# Patient Record
Sex: FEMALE | Race: WHITE | Employment: FULL TIME | URBAN - METROPOLITAN AREA
[De-identification: names, ages, dates, MRNs, and addresses within clinical notes are randomized per-mention and may not be internally consistent; named-entity substitution may affect disease eponyms.]

---

## 2017-05-24 ENCOUNTER — HOSPITAL ENCOUNTER (INPATIENT)
Facility: HOSPITAL | Age: 32
LOS: 3 days | Discharge: HOME | DRG: 335 | End: 2017-05-27
Attending: EMERGENCY MEDICINE | Admitting: INTERNAL MEDICINE
Payer: COMMERCIAL

## 2017-05-24 ENCOUNTER — SURGERY (OUTPATIENT)
Age: 32
End: 2017-05-24

## 2017-05-24 ENCOUNTER — APPOINTMENT (OUTPATIENT)
Dept: CT IMAGING | Facility: HOSPITAL | Age: 32
DRG: 335 | End: 2017-05-24
Attending: EMERGENCY MEDICINE
Payer: COMMERCIAL

## 2017-05-24 ENCOUNTER — ANESTHESIA (OUTPATIENT)
Dept: SURGERY | Facility: HOSPITAL | Age: 32
End: 2017-05-24

## 2017-05-24 ENCOUNTER — ANESTHESIA EVENT (OUTPATIENT)
Dept: SURGERY | Facility: HOSPITAL | Age: 32
End: 2017-05-24

## 2017-05-24 DIAGNOSIS — K63.1 BOWEL PERFORATION (HCC): ICD-10-CM

## 2017-05-24 DIAGNOSIS — K56.60 INTESTINAL OBSTRUCTION, UNSPECIFIED TYPE: Primary | ICD-10-CM

## 2017-05-24 PROBLEM — K56.609 INTESTINAL OBSTRUCTION (HCC): Status: ACTIVE | Noted: 2017-05-24

## 2017-05-24 PROCEDURE — 74176 CT ABD & PELVIS W/O CONTRAST: CPT | Performed by: EMERGENCY MEDICINE

## 2017-05-24 PROCEDURE — 0DN80ZZ RELEASE SMALL INTESTINE, OPEN APPROACH: ICD-10-PCS | Performed by: SURGERY

## 2017-05-24 PROCEDURE — 99223 1ST HOSP IP/OBS HIGH 75: CPT | Performed by: INTERNAL MEDICINE

## 2017-05-24 PROCEDURE — 3E1M38Z IRRIGATION OF PERITONEAL CAVITY USING IRRIGATING SUBSTANCE, PERCUTANEOUS APPROACH: ICD-10-PCS | Performed by: SURGERY

## 2017-05-24 DEVICE — SEPRAFILM ADHESION BARRIER (MEMBRANE) IS A STERILE, BIORESORBABLE, TRANSLUCENT ADHESION BARRIER COMPOSED OF TWO ANIONIC POLYSACCHARIDES, SODIUM HYALURONATE (HA) AND CARBOXYMETHYLCELLULOSE (CMC).
Type: IMPLANTABLE DEVICE | Status: FUNCTIONAL
Brand: SEPRAFILM

## 2017-05-24 RX ORDER — IPRATROPIUM BROMIDE AND ALBUTEROL SULFATE 2.5; .5 MG/3ML; MG/3ML
3 SOLUTION RESPIRATORY (INHALATION) EVERY 4 HOURS PRN
Status: DISCONTINUED | OUTPATIENT
Start: 2017-05-24 | End: 2017-05-27

## 2017-05-24 RX ORDER — HYDROCODONE BITARTRATE AND ACETAMINOPHEN 5; 325 MG/1; MG/1
2 TABLET ORAL EVERY 4 HOURS PRN
Status: DISCONTINUED | OUTPATIENT
Start: 2017-05-24 | End: 2017-05-24

## 2017-05-24 RX ORDER — ONDANSETRON 2 MG/ML
4 INJECTION INTRAMUSCULAR; INTRAVENOUS EVERY 6 HOURS PRN
Status: DISCONTINUED | OUTPATIENT
Start: 2017-05-24 | End: 2017-05-27

## 2017-05-24 RX ORDER — MIDAZOLAM HYDROCHLORIDE 1 MG/ML
1 INJECTION INTRAMUSCULAR; INTRAVENOUS EVERY 5 MIN PRN
Status: DISCONTINUED | OUTPATIENT
Start: 2017-05-24 | End: 2017-05-24 | Stop reason: HOSPADM

## 2017-05-24 RX ORDER — SODIUM CHLORIDE, SODIUM LACTATE, POTASSIUM CHLORIDE, CALCIUM CHLORIDE 600; 310; 30; 20 MG/100ML; MG/100ML; MG/100ML; MG/100ML
INJECTION, SOLUTION INTRAVENOUS CONTINUOUS
Status: DISCONTINUED | OUTPATIENT
Start: 2017-05-24 | End: 2017-05-27

## 2017-05-24 RX ORDER — HYDROCODONE BITARTRATE AND ACETAMINOPHEN 5; 325 MG/1; MG/1
1 TABLET ORAL EVERY 4 HOURS PRN
Status: DISCONTINUED | OUTPATIENT
Start: 2017-05-24 | End: 2017-05-27

## 2017-05-24 RX ORDER — ACETAMINOPHEN 325 MG/1
650 TABLET ORAL EVERY 4 HOURS PRN
Status: DISCONTINUED | OUTPATIENT
Start: 2017-05-24 | End: 2017-05-27

## 2017-05-24 RX ORDER — MORPHINE SULFATE 2 MG/ML
2 INJECTION, SOLUTION INTRAMUSCULAR; INTRAVENOUS EVERY 2 HOUR PRN
Status: DISCONTINUED | OUTPATIENT
Start: 2017-05-24 | End: 2017-05-24

## 2017-05-24 RX ORDER — ALBUTEROL SULFATE 2.5 MG/3ML
2.5 SOLUTION RESPIRATORY (INHALATION)
Status: DISCONTINUED | OUTPATIENT
Start: 2017-05-24 | End: 2017-05-24 | Stop reason: HOSPADM

## 2017-05-24 RX ORDER — DIPHENHYDRAMINE HYDROCHLORIDE 50 MG/ML
12.5 INJECTION INTRAMUSCULAR; INTRAVENOUS EVERY 4 HOURS PRN
Status: DISCONTINUED | OUTPATIENT
Start: 2017-05-24 | End: 2017-05-27

## 2017-05-24 RX ORDER — ENOXAPARIN SODIUM 100 MG/ML
40 INJECTION SUBCUTANEOUS DAILY
Status: DISCONTINUED | OUTPATIENT
Start: 2017-05-25 | End: 2017-05-24

## 2017-05-24 RX ORDER — MORPHINE SULFATE 4 MG/ML
4 INJECTION, SOLUTION INTRAMUSCULAR; INTRAVENOUS EVERY 2 HOUR PRN
Status: DISCONTINUED | OUTPATIENT
Start: 2017-05-24 | End: 2017-05-24

## 2017-05-24 RX ORDER — MORPHINE SULFATE 2 MG/ML
1 INJECTION, SOLUTION INTRAMUSCULAR; INTRAVENOUS EVERY 2 HOUR PRN
Status: DISCONTINUED | OUTPATIENT
Start: 2017-05-24 | End: 2017-05-24

## 2017-05-24 RX ORDER — KETOROLAC TROMETHAMINE 30 MG/ML
30 INJECTION, SOLUTION INTRAMUSCULAR; INTRAVENOUS EVERY 6 HOURS PRN
Status: DISPENSED | OUTPATIENT
Start: 2017-05-24 | End: 2017-05-26

## 2017-05-24 RX ORDER — SODIUM CHLORIDE 9 MG/ML
INJECTION, SOLUTION INTRAVENOUS CONTINUOUS
Status: DISCONTINUED | OUTPATIENT
Start: 2017-05-24 | End: 2017-05-27

## 2017-05-24 RX ORDER — SODIUM CHLORIDE 9 MG/ML
INJECTION, SOLUTION INTRAVENOUS CONTINUOUS
Status: DISCONTINUED | OUTPATIENT
Start: 2017-05-24 | End: 2017-05-24

## 2017-05-24 RX ORDER — HYDROCODONE BITARTRATE AND ACETAMINOPHEN 5; 325 MG/1; MG/1
2 TABLET ORAL EVERY 4 HOURS PRN
Status: DISCONTINUED | OUTPATIENT
Start: 2017-05-24 | End: 2017-05-27

## 2017-05-24 RX ORDER — SODIUM CHLORIDE 9 MG/ML
1000 INJECTION, SOLUTION INTRAVENOUS CONTINUOUS
Status: DISCONTINUED | OUTPATIENT
Start: 2017-05-24 | End: 2017-05-24

## 2017-05-24 RX ORDER — ONDANSETRON 2 MG/ML
4 INJECTION INTRAMUSCULAR; INTRAVENOUS ONCE
Status: COMPLETED | OUTPATIENT
Start: 2017-05-24 | End: 2017-05-24

## 2017-05-24 RX ORDER — HYDROMORPHONE HYDROCHLORIDE 1 MG/ML
0.4 INJECTION, SOLUTION INTRAMUSCULAR; INTRAVENOUS; SUBCUTANEOUS EVERY 5 MIN PRN
Status: DISCONTINUED | OUTPATIENT
Start: 2017-05-24 | End: 2017-05-24 | Stop reason: HOSPADM

## 2017-05-24 RX ORDER — NALOXONE HYDROCHLORIDE 0.4 MG/ML
0.08 INJECTION, SOLUTION INTRAMUSCULAR; INTRAVENOUS; SUBCUTANEOUS
Status: DISCONTINUED | OUTPATIENT
Start: 2017-05-24 | End: 2017-05-27

## 2017-05-24 RX ORDER — ENOXAPARIN SODIUM 100 MG/ML
40 INJECTION SUBCUTANEOUS DAILY
Status: DISCONTINUED | OUTPATIENT
Start: 2017-05-24 | End: 2017-05-27

## 2017-05-24 RX ORDER — NALOXONE HYDROCHLORIDE 0.4 MG/ML
80 INJECTION, SOLUTION INTRAMUSCULAR; INTRAVENOUS; SUBCUTANEOUS AS NEEDED
Status: DISCONTINUED | OUTPATIENT
Start: 2017-05-24 | End: 2017-05-24 | Stop reason: HOSPADM

## 2017-05-24 RX ORDER — ONDANSETRON 2 MG/ML
4 INJECTION INTRAMUSCULAR; INTRAVENOUS EVERY 4 HOURS PRN
Status: DISCONTINUED | OUTPATIENT
Start: 2017-05-24 | End: 2017-05-24

## 2017-05-24 RX ORDER — HYDROCODONE BITARTRATE AND ACETAMINOPHEN 5; 325 MG/1; MG/1
1 TABLET ORAL EVERY 4 HOURS PRN
Status: DISCONTINUED | OUTPATIENT
Start: 2017-05-24 | End: 2017-05-24

## 2017-05-24 RX ORDER — MEPERIDINE HYDROCHLORIDE 25 MG/ML
12.5 INJECTION INTRAMUSCULAR; INTRAVENOUS; SUBCUTANEOUS AS NEEDED
Status: DISCONTINUED | OUTPATIENT
Start: 2017-05-24 | End: 2017-05-24 | Stop reason: HOSPADM

## 2017-05-24 RX ORDER — ONDANSETRON 2 MG/ML
4 INJECTION INTRAMUSCULAR; INTRAVENOUS EVERY 6 HOURS PRN
Status: DISCONTINUED | OUTPATIENT
Start: 2017-05-24 | End: 2017-05-24

## 2017-05-24 RX ORDER — BACITRACIN 50000 [USP'U]/1
INJECTION, POWDER, LYOPHILIZED, FOR SOLUTION INTRAMUSCULAR AS NEEDED
Status: DISCONTINUED | OUTPATIENT
Start: 2017-05-24 | End: 2017-05-24 | Stop reason: HOSPADM

## 2017-05-24 RX ORDER — HYDROMORPHONE HYDROCHLORIDE 1 MG/ML
1 INJECTION, SOLUTION INTRAMUSCULAR; INTRAVENOUS; SUBCUTANEOUS EVERY 30 MIN PRN
Status: DISCONTINUED | OUTPATIENT
Start: 2017-05-24 | End: 2017-05-24

## 2017-05-24 RX ORDER — ONDANSETRON 2 MG/ML
4 INJECTION INTRAMUSCULAR; INTRAVENOUS AS NEEDED
Status: DISCONTINUED | OUTPATIENT
Start: 2017-05-24 | End: 2017-05-24 | Stop reason: HOSPADM

## 2017-05-24 RX ORDER — HYDROMORPHONE HYDROCHLORIDE 1 MG/ML
0.4 INJECTION, SOLUTION INTRAMUSCULAR; INTRAVENOUS; SUBCUTANEOUS EVERY 30 MIN PRN
Status: DISCONTINUED | OUTPATIENT
Start: 2017-05-24 | End: 2017-05-25

## 2017-05-24 RX ORDER — HYDROMORPHONE HYDROCHLORIDE 1 MG/ML
INJECTION, SOLUTION INTRAMUSCULAR; INTRAVENOUS; SUBCUTANEOUS
Status: COMPLETED
Start: 2017-05-24 | End: 2017-05-24

## 2017-05-24 RX ORDER — HYDROCODONE BITARTRATE AND ACETAMINOPHEN 5; 325 MG/1; MG/1
2 TABLET ORAL AS NEEDED
Status: DISCONTINUED | OUTPATIENT
Start: 2017-05-24 | End: 2017-05-24 | Stop reason: HOSPADM

## 2017-05-24 RX ORDER — METOCLOPRAMIDE HYDROCHLORIDE 5 MG/ML
10 INJECTION INTRAMUSCULAR; INTRAVENOUS EVERY 6 HOURS PRN
Status: DISCONTINUED | OUTPATIENT
Start: 2017-05-24 | End: 2017-05-27

## 2017-05-24 RX ORDER — HYDROCODONE BITARTRATE AND ACETAMINOPHEN 5; 325 MG/1; MG/1
1 TABLET ORAL AS NEEDED
Status: DISCONTINUED | OUTPATIENT
Start: 2017-05-24 | End: 2017-05-24 | Stop reason: HOSPADM

## 2017-05-24 RX ORDER — NALBUPHINE HCL 10 MG/ML
2.5 AMPUL (ML) INJECTION EVERY 4 HOURS PRN
Status: DISCONTINUED | OUTPATIENT
Start: 2017-05-24 | End: 2017-05-27

## 2017-05-24 NOTE — BRIEF OP NOTE
Pre-Operative Diagnosis: Peritoneal free air [K66.8]     Post-Operative Diagnosis: PERITONEAL FREE AIR, ADHESIONS     Procedure Performed:   Procedure(s):  EXPLORATORY LAPAROTOMY, LYSIS OF ADHESIONS, WASHOUT    Surgeon(s) and Role:     Serena Birmingham,

## 2017-05-24 NOTE — CONSULTS
[facility] Pharmacy Note:  Pain Consult    Deborah Acevedo is a 33year old female started on Dilaudid PCA by Dr. Fadi Allen. Pharmacy was consulted to review medication profile and to discontinue previously ordered narcotics and sedatives.     [facility]

## 2017-05-24 NOTE — H&P
[facility]  History and Physical     [address] [facility] Patient Status:  Emergency    1985 MRN [id number]   Location [address] [facility] Attending Sage Whitaker MD   Hosp Day # 0 PCP None Pcp     Chief Co comprehensive 14 point review of systems was completed. Pertinent positives and negatives noted in the HPI.     Physical Exam:    /58 mmHg  Pulse 79  Temp(Src) 97.5 °F (36.4 °C) (Temporal)  Resp 20  Ht 5' 7\" (1.702 m)  Wt 145 lb (65.772 kg)  BMI 2 needed      Quality:  · DVT Prophylaxis: lovenox  · CODE status: full  · Conley: no    Plan of care discussed with pt and er    Karla Pickering MD  5/24/2017

## 2017-05-24 NOTE — PLAN OF CARE
Patient is post op day 0. Returned from surgery at 0800. She has ng tube to lis, sierra to drain. abd with abd dressing, ice pack to abd. Room air sat 94%. Patient pain is 2-3 at present. Family at bedside. Orientated to room and routine.

## 2017-05-24 NOTE — ANESTHESIA POSTPROCEDURE EVALUATION
THE [facility] Patient Status:  Emergency   Age/Gender 33year old female MRN [id number]   [facility] SURGERY Attending Basilia Ornelas MD   Hosp Day # 0 PCP None Pcp       Anesthesia Post-op Note    Procedur

## 2017-05-24 NOTE — ED INITIAL ASSESSMENT (HPI)
Pt reports generalized abd pain for 1 day. 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 Required bowel surgery at 5days old. States she has adhesions. Has had severe abd pain r/t adhesions in the past. Last in 2013. Emesis X8 in since 2300. Diarhea yesterday and today.

## 2017-05-24 NOTE — ED PROVIDER NOTES
Patient Seen in: [facility] Perioperative    History   Patient presents with:  Abdomen/Flank Pain (GI/)    Stated Complaint: abd pain     HPI    Patient is a 77-year-old female comes in emergency room with chief complaint of abdominal pain and vom Current:BP 95/52 mmHg  Pulse 79  Temp(Src) 97.5 °F (36.4 °C) (Temporal)  Resp 18  Ht 170.2 cm (5' 7\")  Wt 65.772 kg  BMI 22.71 kg/m2  SpO2 100%  LMP 05/14/2017        Physical Exam    GENERAL: No acute distress, well appearing and non-toxic, Alert a WITH CULTURE REFLEX   TYPE AND SCREEN    Narrative: The following orders were created for panel order TYPE AND SCREEN.   Procedure                               Abnormality         Status                     ---------                               -----

## 2017-05-24 NOTE — PAYOR COMM NOTE
Attending Physician: Wilber Escobar MD    5/23  ED           Abdomen/Flank Pain (GI/)    Stated Complaint: abd pain     +  abdominal pain and vomiting.    Patient complains of abdominal pain which started yesterday morning.  Symptoms were intermittent a Temporal    SpO2  05/24/17 0149  100 %    O2 Device  05/24/17 0149  None (Room air)                    CT abdomen pelvis shows moderate to severe distention of proximal loops of small bowel.  Compatible small bowel obstruction.  Pneumoperitoneum is noted c Impression:  Intestinal obstruction  Bowel perforation       IV ZOSYN Q8  DILAUDID PCA          5/24  TO OR      Pre-Operative Diagnosis: Peritoneal free air      Post-Operative Diagnosis: PERITONEAL FREE AIR, ADHESIONS     Procedure Performed:    P

## 2017-05-24 NOTE — CONSULTS
[facility]  Report of Consultation    [address] [facility] Patient Status:  Emergency    1985 MRN [id number]   Location [address] [place] [address] Attending Yani Snider MD   Hosp Day # 0 PCP None Pcp     Reason for [facility] pressure 95/52, pulse 79, temperature 97.5 °F (36.4 °C), temperature source Temporal, resp. rate 18, height 5' 7\" (1.702 m), weight 145 lb (65.772 kg), last menstrual period 05/14/2017, SpO2 100 %. General: Alert, orientated x3. Cooperative.   No appar

## 2017-05-24 NOTE — ANESTHESIA PREPROCEDURE EVALUATION
PRE-OP EVALUATION    Patient Name: Edilma Castro    Pre-op Diagnosis: Peritoneal free air [K66.8]    Procedure(s):  EXPLORATORY LAPAROTOMY, POSSIBLE BOWEL RESECTION    Surgeon(s) and Role:     Willie Regalado MD - Primary    Pre-op vital 05/24/2017   MCH 31.5 05/24/2017   MCHC 33.6 05/24/2017   RDW 12.3 05/24/2017   .0 05/24/2017       Lab Results  Component Value Date    05/24/2017   K 3.6 05/24/2017    05/24/2017   CO2 21.0* 05/24/2017   BUN 12 05/24/2017   CREATSERUM

## 2017-05-25 PROCEDURE — 99232 SBSQ HOSP IP/OBS MODERATE 35: CPT | Performed by: HOSPITALIST

## 2017-05-25 RX ORDER — HYDROMORPHONE HYDROCHLORIDE 1 MG/ML
1 INJECTION, SOLUTION INTRAMUSCULAR; INTRAVENOUS; SUBCUTANEOUS EVERY 2 HOUR PRN
Status: DISCONTINUED | OUTPATIENT
Start: 2017-05-25 | End: 2017-05-27

## 2017-05-25 RX ORDER — HYDROMORPHONE HYDROCHLORIDE 1 MG/ML
0.5 INJECTION, SOLUTION INTRAMUSCULAR; INTRAVENOUS; SUBCUTANEOUS EVERY 2 HOUR PRN
Status: DISCONTINUED | OUTPATIENT
Start: 2017-05-25 | End: 2017-05-27

## 2017-05-25 NOTE — PAYOR COMM NOTE
Attending Physician: Peyton Enrique MD    5/25    CONTINUED STAY    TO OR      OPERATIVE REPORT    PREOPERATIVE DIAGNOSIS:  Acute abdomen with high-grade small bowel obstruction with possible closed loop obstruction with pneumoperitoneum.   POSTOPERATIVE D

## 2017-05-25 NOTE — OPERATIVE REPORT
[facility]    PATIENT'S NAME: Chyna GARCIA RAULITO   ATTENDING PHYSICIAN: Je Baxter M.D. OPERATING PHYSICIAN: Je Baxter M.D.    PATIENT ACCOUNT#:   [de-identified]    LOCATION:  [address] [place] [address]  MEDICAL RECORD #:   [id number]       DA high-grade obstruction in the mid small bowel secondary to adhesions. An adhesion lysis procedure was performed to bring up this obstruction all the way down to the cecum, which was in the left lower quadrant.   The entire colon appeared to be shifted over

## 2017-05-25 NOTE — PROGRESS NOTES
[facility]  Progress Note    [address] [facility] Patient Status:  Inpatient    1985 MRN [id number]   [facility] 0SW-A Attending Basilia Ornelas MD   Hosp Day # 1 PCP None Pcp     Subjective:    Patient reports pain con

## 2017-05-25 NOTE — PLAN OF CARE
Assumed care of patient at 0730, a+ox 4, ngt to lis, with brown output. Denies nausea, c/p or sob at this time. C/o pain in the abdomen that is helped with iv pain meds. pca d/c'd today and using iv meds prn.  On r/a with 02 stats in high 90's, is at bedsid

## 2017-05-25 NOTE — PROGRESS NOTES
[facility]  Progress Note     Kavon Castro Patient Status:  Inpatient    1985 MRN [id number]   [facility] 0SW-A Attending Arnulfo Bagley MD   Hosp Day # 1 PCP None Pcp     Chief Complaint: abd pain    S: Juliana Moder stable  3.  Anemia- ac postop expected, mild    Plan of care: as above    Quality:  · DVT Prophylaxis: Lovenox  · CODE status: full  · Conley: no  · Central line: no    Estimated date of discharge: am  Discharge is dependent on: clinical status  At this poin

## 2017-05-26 PROCEDURE — 99232 SBSQ HOSP IP/OBS MODERATE 35: CPT | Performed by: HOSPITALIST

## 2017-05-26 RX ORDER — IBUPROFEN 600 MG/1
600 TABLET ORAL EVERY 6 HOURS PRN
Status: DISCONTINUED | OUTPATIENT
Start: 2017-05-26 | End: 2017-05-27

## 2017-05-26 RX ORDER — POTASSIUM CHLORIDE 14.9 MG/ML
20 INJECTION INTRAVENOUS ONCE
Status: DISCONTINUED | OUTPATIENT
Start: 2017-05-26 | End: 2017-05-26

## 2017-05-26 NOTE — PLAN OF CARE
Pt tolerating CL diet without nausea. Taking Tylenol for pain; declines Norco.  Motrin order obtained from Dr Aziza Marshall.

## 2017-05-26 NOTE — PLAN OF CARE
Pt tolerated NG being clamped for 8 hours; pt states still passing flatus. NG removed and CL diet ordered.

## 2017-05-26 NOTE — PROGRESS NOTES
[facility]  Progress Note    Concha Castro Patient Status:  Inpatient    1985 MRN [id number]   [facility] 3NW-A Attending Gina Xiao MD   Hosp Day # 2 PCP None Pcp     Subjective:  Passing flatus, ng output

## 2017-05-26 NOTE — PAYOR COMM NOTE
Attending Physician: Yani Snider MD    Review Type: CONTINUED STAY  Reviewer: Janie Orellana     Date: May 26, 2017 - 10:21 AM  Payor: [employer]  Authorization Number: [id number]  Admit date: 5/24/2017  1:39 AM        Progress Notes by Yani Snider MD Action Dose Route User    5/25/2017 1510 Given 1 lozenge Oral Greg Pedroza RN    5/25/2017 1143 Given 1 lozenge Oral Marga Taylor RN      Enoxaparin Sodium (LOVENOX) 40 MG/0.4ML injection 40 mg     Date Action Dose Route User    5/26/2017 0842 Given 40 mg Pedraza

## 2017-05-26 NOTE — PROGRESS NOTES
[facility]  Progress Note     Anna Castro Patient Status:  Inpatient    1985 MRN [id number]   [facility] 3NW-A Attending Kat Jauregui MD   Hosp Day # 2 PCP None Pcp     Chief Complaint: abd pain    S: Diane Nunez grade sbo with possible closed loop obstruction s/p expl lap lysis of adhesions- doing well  2. Asthma stable  3.  Anemia- ac postop expected, mild    Plan of care: as above    Quality:  · DVT Prophylaxis: Lovenox  · CODE status: full  · Conley: no  · [facility]

## 2017-05-26 NOTE — PROGRESS NOTES
Patient transferred from [facility] in stable condition. Alert and orientated x4. Lung sounds clear with diminished bases. IS encouragement. Abdomen soft, tender, and non-distended. Bowel sounds hypoactive and denies passing flatus. NPO with ice.  NG to low-

## 2017-05-26 NOTE — PLAN OF CARE
1900: Pt received AOx4. Room air. NGT to LIS with brownish dark red output. Abd incision PENELOPE w/ steristrips. Family at bedside visiting. 2100: Pt ambulated in the hallway-tolerated. Pt reported passing gas. 0530: Pt slept well.  Dilaudid and Toradol prn-

## 2017-05-27 VITALS
RESPIRATION RATE: 18 BRPM | WEIGHT: 145 LBS | SYSTOLIC BLOOD PRESSURE: 120 MMHG | TEMPERATURE: 99 F | BODY MASS INDEX: 22.76 KG/M2 | HEART RATE: 77 BPM | DIASTOLIC BLOOD PRESSURE: 78 MMHG | HEIGHT: 67 IN | OXYGEN SATURATION: 98 %

## 2017-05-27 PROCEDURE — 99239 HOSP IP/OBS DSCHRG MGMT >30: CPT | Performed by: HOSPITALIST

## 2017-05-27 RX ORDER — IBUPROFEN 600 MG/1
600 TABLET ORAL EVERY 6 HOURS PRN
Qty: 20 TABLET | Refills: 0 | Status: SHIPPED | OUTPATIENT
Start: 2017-05-27

## 2017-05-27 RX ORDER — HYDROCODONE BITARTRATE AND ACETAMINOPHEN 5; 325 MG/1; MG/1
1 TABLET ORAL EVERY 8 HOURS PRN
Qty: 20 TABLET | Refills: 0 | Status: ON HOLD | OUTPATIENT
Start: 2017-05-27 | End: 2019-07-17

## 2017-05-27 NOTE — PLAN OF CARE
Pt tolerated soft diet without increased pain or nausea and ready for discharge. Pt given dc instructions along with RX for Norco and ibuprofen. Pt stated understanding.

## 2017-05-27 NOTE — DISCHARGE SUMMARY
[facility] HOSPITALIST  DISCHARGE SUMMARY     Paddy Corcoran Matthew Patient Status:  Inpatient    1985 MRN [id number]   [facility] 3NW-A Attending Evie Almanzar MD   Hosp Day # 3 PCP None Pcp     Date of Admission: 2017  D Quantity:  20 tablet   Refills:  0         CONTINUE taking these medications       Instructions Prescription details    ALBUTEROL SULFATE        PRN    Refills:  0       SYMBICORT IN        2 puffs once daily    Refills:  0       XYZAL OR        Take 1 tab

## 2017-05-27 NOTE — PROGRESS NOTES
[facility]  Progress Note    Eliezer Castro Patient Status:  Inpatient    1985 MRN [id number]   [facility] 3NW-A Attending Avis Velasquez MD   Hosp Day # 3 PCP None Pcp     Subjective:  Patient is feeling better

## 2017-05-30 NOTE — DISCHARGE SUMMARY
[facility]  Discharge Summary    [address] [facility] Patient Status:  Inpatient    1985 MRN [id number]   [facility] 3NW-A Attending No att. providers found   Hosp Day # 3 PCP None Pcp     Date of Admission: 2017 Visits:  Follow-up with Dr Fadi Allen in 1 week            Nick Eubanks [facility] Surgery  5/30/2017

## 2017-05-31 NOTE — PAYOR COMM NOTE
Review Type: CONTINUED STAY  Reviewer: Felipe Salas     Date: May 31, 2017 - 11:55 AM  Payor: [employer]  Authorization Number: [id number]  Admit date: 5/24/2017  1:39 AM     Discharge date:  5/27/17       Discharge Summaries by Anna Martini MD at 5/27/2 HOSPITALIST  Progress Note    [address] [facility]  Patient Status:  Inpatient      1985  Wang Jimenez [id number]    [facility] 3NW-A  Attending  Hallie George MD    Hosp Day #  2  PCP  None Pcp      Chief Complaint: abd pain •  enoxaparin   40 mg  Subcutaneous  Daily          ASSESSMENT / PLAN:        2. Acute abd pain with high grade sbo with possible closed loop obstruction s/p expl lap lysis of adhesions- doing well  3. Asthma stable  4.  Anemia- ac postop expected, mild

## 2017-10-10 ENCOUNTER — CHARTING TRANS (OUTPATIENT)
Dept: OTHER | Age: 32
End: 2017-10-10

## 2018-01-06 ENCOUNTER — CHARTING TRANS (OUTPATIENT)
Dept: OTHER | Age: 33
End: 2018-01-06

## 2018-09-12 ENCOUNTER — CHARTING TRANS (OUTPATIENT)
Dept: OTHER | Age: 33
End: 2018-09-12

## 2018-09-20 ENCOUNTER — HOSPITAL (OUTPATIENT)
Dept: OTHER | Age: 33
End: 2018-09-20
Attending: ANESTHESIOLOGY

## 2018-11-02 VITALS
BODY MASS INDEX: 22.76 KG/M2 | OXYGEN SATURATION: 97 % | WEIGHT: 145 LBS | SYSTOLIC BLOOD PRESSURE: 130 MMHG | HEART RATE: 80 BPM | DIASTOLIC BLOOD PRESSURE: 77 MMHG | HEIGHT: 67 IN

## 2018-12-04 LAB
ANTIBODY SCREEN OB: NEGATIVE
HEPATITIS B SURFACE ANTIGEN OB: NEGATIVE
HIV RESULT OB: NEGATIVE
RAPID PLASMA REAGIN OB: NONREACTIVE

## 2019-02-26 ENCOUNTER — OFFICE VISIT (OUTPATIENT)
Dept: PERINATAL CARE | Facility: HOSPITAL | Age: 34
End: 2019-02-26
Attending: OBSTETRICS & GYNECOLOGY
Payer: COMMERCIAL

## 2019-02-26 VITALS
DIASTOLIC BLOOD PRESSURE: 75 MMHG | HEART RATE: 78 BPM | HEIGHT: 67 IN | WEIGHT: 170 LBS | BODY MASS INDEX: 26.68 KG/M2 | SYSTOLIC BLOOD PRESSURE: 112 MMHG

## 2019-02-26 DIAGNOSIS — O09.812 PREGNANCY RESULTING FROM IN VITRO FERTILIZATION IN SECOND TRIMESTER: ICD-10-CM

## 2019-02-26 DIAGNOSIS — O09.512 PRIMIGRAVIDA OF ADVANCED MATERNAL AGE IN SECOND TRIMESTER: ICD-10-CM

## 2019-02-26 DIAGNOSIS — Z87.19 HISTORY OF SMALL BOWEL OBSTRUCTION: ICD-10-CM

## 2019-02-26 PROBLEM — O09.519 AMA (ADVANCED MATERNAL AGE) PRIMIGRAVIDA 35+: Status: ACTIVE | Noted: 2019-02-26

## 2019-02-26 PROBLEM — O09.519 AMA (ADVANCED MATERNAL AGE) PRIMIGRAVIDA 35+ (HCC): Status: ACTIVE | Noted: 2019-02-26

## 2019-02-26 PROCEDURE — 99243 OFF/OP CNSLTJ NEW/EST LOW 30: CPT | Performed by: OBSTETRICS & GYNECOLOGY

## 2019-02-26 PROCEDURE — 76811 OB US DETAILED SNGL FETUS: CPT | Performed by: OBSTETRICS & GYNECOLOGY

## 2019-02-26 RX ORDER — CHOLECALCIFEROL (VITAMIN D3) 25 MCG
1 TABLET,CHEWABLE ORAL DAILY
COMMUNITY

## 2019-02-26 NOTE — PROGRESS NOTES
Indication: IVF, hx situs inversus abd organs. Maternal age (26 years). ____________________________________________________________________________  History: Age: 26 years. Maternal age at [facility]: 26 years. : 3 Para: 2.  No of embryos: 1, (6 days pellucidi. Heart: Visualized and normal appearance: four-chamber view, left outflow tract, right outflow tract.    Aortic arch: Normal.    ____________________________________________________________________________  Maternal Structures:  Cervical length 4 three fold higher rates of hospitalization,  delivery, and pregnancy-related complications when compared to their younger counterparts. The two most common medical problems complicating these  pregnanccies are hypertension and diabetes.    The inci abnormalities are structural and unrelated to aneuploidy, thus they would not be detected by karyotype analysis. For these reasons a complete, detailed ultrasound (level II) is advised even if the fetus has a normal karyotype.       Fetal Aneuploidy      I risk of  birth and low birth weight in gay pregnancies. ART does not appear to be an independent risk factor for adverse neurodevelopment outcome.   ART appears to be at increased risk of delivering offspring with congenital malformations comp

## 2019-03-19 ENCOUNTER — OFFICE VISIT (OUTPATIENT)
Dept: PERINATAL CARE | Facility: HOSPITAL | Age: 34
End: 2019-03-19
Attending: OBSTETRICS & GYNECOLOGY
Payer: COMMERCIAL

## 2019-03-19 VITALS
WEIGHT: 173 LBS | HEIGHT: 67 IN | BODY MASS INDEX: 27.15 KG/M2 | SYSTOLIC BLOOD PRESSURE: 140 MMHG | HEART RATE: 75 BPM | DIASTOLIC BLOOD PRESSURE: 77 MMHG

## 2019-03-19 DIAGNOSIS — O09.512 PRIMIGRAVIDA OF ADVANCED MATERNAL AGE IN SECOND TRIMESTER: ICD-10-CM

## 2019-03-19 DIAGNOSIS — O09.812 PREGNANCY RESULTING FROM IN VITRO FERTILIZATION IN SECOND TRIMESTER: ICD-10-CM

## 2019-03-19 PROCEDURE — 76825 ECHO EXAM OF FETAL HEART: CPT | Performed by: OBSTETRICS & GYNECOLOGY

## 2019-03-19 PROCEDURE — 99213 OFFICE O/P EST LOW 20 MIN: CPT | Performed by: OBSTETRICS & GYNECOLOGY

## 2019-03-19 PROCEDURE — 93325 DOPPLER ECHO COLOR FLOW MAPG: CPT | Performed by: OBSTETRICS & GYNECOLOGY

## 2019-03-19 PROCEDURE — 76827 ECHO EXAM OF FETAL HEART: CPT | Performed by: OBSTETRICS & GYNECOLOGY

## 2019-03-19 NOTE — PROGRESS NOTES
Indication: IVF, hx of situs inversus. Maternal age (26 years). ____________________________________________________________________________  History: Age: 26 years. Maternal age at [facility]: 26 years. : 3 Para: 2.  No of embryos: 1, (6 days between pregnancies in women of advanced maternal age (28 or older at delivery) are associated with elevated risks.  Specifically, there is a higher rate of:  · Fetal malformations  · Preeclampsia  · Gestational diabetes  · Intrauterine fetal death            Jacqueline is a right to left shunting across the patent foramen ovale. There is a normal appearance of the aorta and branching pattern of the head vessels. There appears to be a structurally normal fetal heart and rhythm.   The patient was made aware of the oconnell

## 2019-04-19 LAB — HIV RESULT OB: NEGATIVE

## 2019-05-14 NOTE — PROGRESS NOTES
Israel Mcclellan  Dear Dr. Jayne Mares,     Thank you for requesting ultrasound evaluation and maternal fetal medicine consultation on your patient [facility]   As you are aware she is a 26year old female  wit 34w2d)  .0 mm 23rd% 32w0d (29w0d to 34w6d)  .7 mm 61st% 32w0d (31w0d to 33w0d)  FL 61.1 mm 41st% 31w5d (28w5d to 34w5d)  OFD 99.4 mm 14th% 29w6d  HUM 53.6 mm 43rd% 31w2d  VENTRp 5.1 mm n/a  HC/AC Ratio 1.037  33rd%  FL/AC Ratio 0.218  n/a  BPD/  labor and monitoring fetal movement. We discussed reasons for her to call her physician. We discussed the recommended plan of care based on her  risk factors.   Al Pastor had her questions answered to her satisfaction.     IMPRESSION:  · IUP

## 2019-05-15 ENCOUNTER — OFFICE VISIT (OUTPATIENT)
Dept: PERINATAL CARE | Facility: HOSPITAL | Age: 34
End: 2019-05-15
Attending: OBSTETRICS & GYNECOLOGY
Payer: COMMERCIAL

## 2019-05-15 VITALS
SYSTOLIC BLOOD PRESSURE: 115 MMHG | WEIGHT: 181 LBS | BODY MASS INDEX: 28 KG/M2 | DIASTOLIC BLOOD PRESSURE: 72 MMHG | HEART RATE: 63 BPM

## 2019-05-15 DIAGNOSIS — O09.513 PRIMIGRAVIDA OF ADVANCED MATERNAL AGE IN THIRD TRIMESTER: ICD-10-CM

## 2019-05-15 PROCEDURE — 99213 OFFICE O/P EST LOW 20 MIN: CPT | Performed by: OBSTETRICS & GYNECOLOGY

## 2019-05-15 PROCEDURE — 76819 FETAL BIOPHYS PROFIL W/O NST: CPT | Performed by: OBSTETRICS & GYNECOLOGY

## 2019-05-15 PROCEDURE — 76805 OB US >/= 14 WKS SNGL FETUS: CPT | Performed by: OBSTETRICS & GYNECOLOGY

## 2019-06-14 LAB — STREP GP B CULT OB: NEGATIVE

## 2019-07-14 ENCOUNTER — APPOINTMENT (OUTPATIENT)
Dept: OBGYN CLINIC | Facility: HOSPITAL | Age: 34
End: 2019-07-14
Payer: COMMERCIAL

## 2019-07-14 ENCOUNTER — HOSPITAL ENCOUNTER (INPATIENT)
Facility: HOSPITAL | Age: 34
LOS: 3 days | Discharge: HOME | End: 2019-07-17
Attending: OBSTETRICS & GYNECOLOGY | Admitting: OBSTETRICS & GYNECOLOGY
Payer: COMMERCIAL

## 2019-07-14 PROBLEM — Z34.90 PREGNANCY (HCC): Status: ACTIVE | Noted: 2019-07-14

## 2019-07-14 PROBLEM — Z34.90 PREGNANCY: Status: ACTIVE | Noted: 2019-07-14

## 2019-07-14 LAB
ANTIBODY SCREEN: NEGATIVE
DEPRECATED RDW RBC AUTO: 41.1 FL (ref 35.1–46.3)
ERYTHROCYTE [DISTWIDTH] IN BLOOD BY AUTOMATED COUNT: 11.9 % (ref 11–15)
HCT VFR BLD AUTO: 38.4 % (ref 35–48)
HGB BLD-MCNC: 13 G/DL (ref 12–16)
MCH RBC QN AUTO: 32.2 PG (ref 26–34)
MCHC RBC AUTO-ENTMCNC: 33.9 G/DL (ref 31–37)
MCV RBC AUTO: 95 FL (ref 80–100)
PLATELET # BLD AUTO: 207 10(3)UL (ref 150–450)
RBC # BLD AUTO: 4.04 X10(6)UL (ref 3.8–5.3)
RH BLOOD TYPE: POSITIVE
T PALLIDUM AB SER QL IA: NONREACTIVE
WBC # BLD AUTO: 14.7 X10(3) UL (ref 4–11)

## 2019-07-14 PROCEDURE — 86780 TREPONEMA PALLIDUM: CPT | Performed by: OBSTETRICS & GYNECOLOGY

## 2019-07-14 PROCEDURE — 86900 BLOOD TYPING SEROLOGIC ABO: CPT | Performed by: OBSTETRICS & GYNECOLOGY

## 2019-07-14 PROCEDURE — 3E0P7VZ INTRODUCTION OF HORMONE INTO FEMALE REPRODUCTIVE, VIA NATURAL OR ARTIFICIAL OPENING: ICD-10-PCS | Performed by: OBSTETRICS & GYNECOLOGY

## 2019-07-14 PROCEDURE — 85027 COMPLETE CBC AUTOMATED: CPT | Performed by: OBSTETRICS & GYNECOLOGY

## 2019-07-14 PROCEDURE — 86901 BLOOD TYPING SEROLOGIC RH(D): CPT | Performed by: OBSTETRICS & GYNECOLOGY

## 2019-07-14 PROCEDURE — 86850 RBC ANTIBODY SCREEN: CPT | Performed by: OBSTETRICS & GYNECOLOGY

## 2019-07-14 RX ORDER — IBUPROFEN 600 MG/1
600 TABLET ORAL ONCE AS NEEDED
Status: DISCONTINUED | OUTPATIENT
Start: 2019-07-14 | End: 2019-07-15 | Stop reason: HOSPADM

## 2019-07-14 RX ORDER — TERBUTALINE SULFATE 1 MG/ML
0.25 INJECTION, SOLUTION SUBCUTANEOUS AS NEEDED
Status: DISCONTINUED | OUTPATIENT
Start: 2019-07-14 | End: 2019-07-15 | Stop reason: HOSPADM

## 2019-07-14 RX ORDER — CALCIUM CARBONATE 200(500)MG
1000 TABLET,CHEWABLE ORAL 2 TIMES DAILY PRN
Status: DISCONTINUED | OUTPATIENT
Start: 2019-07-14 | End: 2019-07-15

## 2019-07-14 RX ORDER — DEXTROSE, SODIUM CHLORIDE, SODIUM LACTATE, POTASSIUM CHLORIDE, AND CALCIUM CHLORIDE 5; .6; .31; .03; .02 G/100ML; G/100ML; G/100ML; G/100ML; G/100ML
INJECTION, SOLUTION INTRAVENOUS AS NEEDED
Status: DISCONTINUED | OUTPATIENT
Start: 2019-07-14 | End: 2019-07-15 | Stop reason: HOSPADM

## 2019-07-14 RX ORDER — ZOLPIDEM TARTRATE 5 MG/1
5 TABLET ORAL NIGHTLY PRN
Status: DISCONTINUED | OUTPATIENT
Start: 2019-07-14 | End: 2019-07-15 | Stop reason: HOSPADM

## 2019-07-14 RX ORDER — SODIUM CHLORIDE, SODIUM LACTATE, POTASSIUM CHLORIDE, CALCIUM CHLORIDE 600; 310; 30; 20 MG/100ML; MG/100ML; MG/100ML; MG/100ML
INJECTION, SOLUTION INTRAVENOUS CONTINUOUS
Status: DISCONTINUED | OUTPATIENT
Start: 2019-07-14 | End: 2019-07-15 | Stop reason: HOSPADM

## 2019-07-15 PROCEDURE — 0UQMXZZ REPAIR VULVA, EXTERNAL APPROACH: ICD-10-PCS | Performed by: OBSTETRICS & GYNECOLOGY

## 2019-07-15 PROCEDURE — 0UBMXZZ EXCISION OF VULVA, EXTERNAL APPROACH: ICD-10-PCS | Performed by: OBSTETRICS & GYNECOLOGY

## 2019-07-15 PROCEDURE — 0KQM0ZZ REPAIR PERINEUM MUSCLE, OPEN APPROACH: ICD-10-PCS | Performed by: OBSTETRICS & GYNECOLOGY

## 2019-07-15 RX ORDER — ACETAMINOPHEN 325 MG/1
650 TABLET ORAL EVERY 6 HOURS PRN
Status: DISCONTINUED | OUTPATIENT
Start: 2019-07-15 | End: 2019-07-17

## 2019-07-15 RX ORDER — CEFAZOLIN SODIUM/WATER 2 G/20 ML
2 SYRINGE (ML) INTRAVENOUS ONCE
Status: COMPLETED | OUTPATIENT
Start: 2019-07-15 | End: 2019-07-15

## 2019-07-15 RX ORDER — HYDROMORPHONE HYDROCHLORIDE 1 MG/ML
1 INJECTION, SOLUTION INTRAMUSCULAR; INTRAVENOUS; SUBCUTANEOUS EVERY 2 HOUR PRN
Status: DISCONTINUED | OUTPATIENT
Start: 2019-07-15 | End: 2019-07-15

## 2019-07-15 RX ORDER — EPHEDRINE SULFATE/0.9% NACL/PF 25 MG/5 ML
5 SYRINGE (ML) INTRAVENOUS AS NEEDED
Status: DISCONTINUED | OUTPATIENT
Start: 2019-07-15 | End: 2019-07-15

## 2019-07-15 RX ORDER — ZOLPIDEM TARTRATE 5 MG/1
5 TABLET ORAL NIGHTLY PRN
Status: DISCONTINUED | OUTPATIENT
Start: 2019-07-15 | End: 2019-07-17

## 2019-07-15 RX ORDER — ONDANSETRON 2 MG/ML
INJECTION INTRAMUSCULAR; INTRAVENOUS
Status: COMPLETED
Start: 2019-07-15 | End: 2019-07-15

## 2019-07-15 RX ORDER — NALBUPHINE HCL 10 MG/ML
2.5 AMPUL (ML) INJECTION
Status: DISCONTINUED | OUTPATIENT
Start: 2019-07-15 | End: 2019-07-15

## 2019-07-15 RX ORDER — ONDANSETRON 2 MG/ML
4 INJECTION INTRAMUSCULAR; INTRAVENOUS EVERY 6 HOURS PRN
Status: DISCONTINUED | OUTPATIENT
Start: 2019-07-15 | End: 2019-07-15

## 2019-07-15 RX ORDER — SIMETHICONE 80 MG
80 TABLET,CHEWABLE ORAL 3 TIMES DAILY PRN
Status: DISCONTINUED | OUTPATIENT
Start: 2019-07-15 | End: 2019-07-17

## 2019-07-15 RX ORDER — CEFAZOLIN SODIUM/WATER 2 G/20 ML
SYRINGE (ML) INTRAVENOUS
Status: DISPENSED
Start: 2019-07-15 | End: 2019-07-16

## 2019-07-15 RX ORDER — IBUPROFEN 600 MG/1
600 TABLET ORAL EVERY 6 HOURS
Status: DISCONTINUED | OUTPATIENT
Start: 2019-07-15 | End: 2019-07-17

## 2019-07-15 RX ORDER — DOCUSATE SODIUM 100 MG/1
100 CAPSULE, LIQUID FILLED ORAL
Status: DISCONTINUED | OUTPATIENT
Start: 2019-07-15 | End: 2019-07-17

## 2019-07-15 RX ORDER — BISACODYL 10 MG
10 SUPPOSITORY, RECTAL RECTAL ONCE AS NEEDED
Status: ACTIVE | OUTPATIENT
Start: 2019-07-15 | End: 2019-07-15

## 2019-07-15 NOTE — L&D DELIVERY NOTE
[facility]    PATIENT'S NAME: Alysa [facility]   ATTENDING PHYSICIAN: Leah Cortes M.D.    PATIENT ACCOUNT #: [de-identified] LOCATION:  [address] [place] [address]   MEDICAL RECORD #: [id number] YOB: 1985   ADMISSION DATE: 07/14/2019 D fertilization pregnancy. 4.   History of maternal abdominal situs inversus. 5.   Vulvar lesion. PLAN:  Ancef 2 g IV piggyback given times x1 dose. Routine postpartum care, mom and infant.      Dictated By Eleni Bosworth, M.D.  d: 07/15/2019 14:18

## 2019-07-15 NOTE — PROGRESS NOTES
Pt presents as  w/ edc of 19 here for scheduled cervidil IOL. Pt ambulatory and admitted to room 108 accompanied by . EFM tested and applied, FHTs tracing and audible in 130s. Plan of care discussed, pt verbalizes understanding and agrees.

## 2019-07-15 NOTE — H&P
[facility]    PATIENT'S NAME: Kevin CABEZAS   ATTENDING PHYSICIAN: Aimee Frances M.D.    PATIENT ACCOUNT#:   [de-identified]    LOCATION:  [address] [place] [address]  MEDICAL RECORD #:   [id number]       YOB: 1985  ADMISSION DATE: Antibody screen negative x2. ASSESSMENT:    1. Intrauterine pregnancy at 40 and 1/7 weeks. 2.   History of in vitro fertilization. 3.   Advanced maternal age.   4.   History of situs invertus of gastrointestinal tract, status post abdominal lysis

## 2019-07-15 NOTE — PROGRESS NOTES
Patient up to bathroom with assist x 2. Unable to void at this time. Patient transferred to mother/baby room per wheelchair in stable condition with baby and personal belongings. Accompanied by significant other and staff. Report given to mother/baby RN.

## 2019-07-15 NOTE — PROGRESS NOTES
Patient admitted to MB via Félix Shahram to room  Safety precautions initiated  Bed in low position  Call light in reach  Knows to call for assistance first time out of bed

## 2019-07-15 NOTE — L&D DELIVERY NOTE
Judson Castro [[id number]]    Labor Events     labor?:  No   steroids?:  None  Cervical ripening date/time:  2019 1950  Cervical ripening type:  Cervidil  Rupture date/time:  7/15/2019 1244     Rupture type:  SROM, Intac Acrocyanotic Completely pink    Heart rate Absent <100 bpm >100 bpm    Reflex irritability No response Grimace Cry or active withdrawal    Muscle tone Limp Some flexion Active motion    Respiratory effort Absent Weak cry; hypoventilation Good, crying

## 2019-07-16 LAB
BASOPHILS # BLD AUTO: 0.03 X10(3) UL (ref 0–0.2)
BASOPHILS NFR BLD AUTO: 0.2 %
DEPRECATED RDW RBC AUTO: 43.3 FL (ref 35.1–46.3)
EOSINOPHIL # BLD AUTO: 0.13 X10(3) UL (ref 0–0.7)
EOSINOPHIL NFR BLD AUTO: 0.9 %
ERYTHROCYTE [DISTWIDTH] IN BLOOD BY AUTOMATED COUNT: 12.2 % (ref 11–15)
HCT VFR BLD AUTO: 32.9 % (ref 35–48)
HGB BLD-MCNC: 11 G/DL (ref 12–16)
IMM GRANULOCYTES # BLD AUTO: 0.07 X10(3) UL (ref 0–1)
IMM GRANULOCYTES NFR BLD: 0.5 %
LYMPHOCYTES # BLD AUTO: 3.49 X10(3) UL (ref 1–4)
LYMPHOCYTES NFR BLD AUTO: 23.8 %
MCH RBC QN AUTO: 32.5 PG (ref 26–34)
MCHC RBC AUTO-ENTMCNC: 33.4 G/DL (ref 31–37)
MCV RBC AUTO: 97.3 FL (ref 80–100)
MONOCYTES # BLD AUTO: 0.94 X10(3) UL (ref 0.1–1)
MONOCYTES NFR BLD AUTO: 6.4 %
NEUTROPHILS # BLD AUTO: 9.98 X10 (3) UL (ref 1.5–7.7)
NEUTROPHILS # BLD AUTO: 9.98 X10(3) UL (ref 1.5–7.7)
NEUTROPHILS NFR BLD AUTO: 68.2 %
PLATELET # BLD AUTO: 171 10(3)UL (ref 150–450)
RBC # BLD AUTO: 3.38 X10(6)UL (ref 3.8–5.3)
WBC # BLD AUTO: 14.6 X10(3) UL (ref 4–11)

## 2019-07-16 PROCEDURE — 85025 COMPLETE CBC W/AUTO DIFF WBC: CPT | Performed by: OBSTETRICS & GYNECOLOGY

## 2019-07-16 NOTE — PROGRESS NOTES
[address] [facility] Vaginal Delivery Progress Note    [address] [facility] Patient Status:  Inpatient    1985 MRN [id number]   [facility] 1SW-J Attending Rhoda Merlin, MD, MD   [facility] Day # 2 PCP None Pcp     SUBJEC

## 2019-07-17 VITALS
WEIGHT: 196 LBS | RESPIRATION RATE: 17 BRPM | DIASTOLIC BLOOD PRESSURE: 64 MMHG | HEIGHT: 66.5 IN | TEMPERATURE: 99 F | HEART RATE: 67 BPM | BODY MASS INDEX: 31.13 KG/M2 | SYSTOLIC BLOOD PRESSURE: 105 MMHG

## 2019-07-17 PROBLEM — K56.60 INTESTINAL OBSTRUCTION, UNSPECIFIED TYPE: Status: RESOLVED | Noted: 2017-05-24 | Resolved: 2019-07-17

## 2019-07-17 PROBLEM — O09.519 AMA (ADVANCED MATERNAL AGE) PRIMIGRAVIDA 35+: Status: RESOLVED | Noted: 2019-02-26 | Resolved: 2019-07-17

## 2019-07-17 PROBLEM — O09.519 AMA (ADVANCED MATERNAL AGE) PRIMIGRAVIDA 35+ (HCC): Status: RESOLVED | Noted: 2019-02-26 | Resolved: 2019-07-17

## 2019-07-17 PROBLEM — O09.812 PREGNANCY RESULTING FROM IN VITRO FERTILIZATION IN SECOND TRIMESTER: Status: RESOLVED | Noted: 2019-02-26 | Resolved: 2019-07-17

## 2019-07-17 PROBLEM — O09.812 PREGNANCY RESULTING FROM IN VITRO FERTILIZATION IN SECOND TRIMESTER (HCC): Status: RESOLVED | Noted: 2019-02-26 | Resolved: 2019-07-17

## 2019-07-17 PROBLEM — K63.1 BOWEL PERFORATION (HCC): Status: RESOLVED | Noted: 2017-05-24 | Resolved: 2019-07-17

## 2019-07-17 PROBLEM — K56.609 INTESTINAL OBSTRUCTION (HCC): Status: RESOLVED | Noted: 2017-05-24 | Resolved: 2019-07-17

## 2019-07-17 NOTE — PROGRESS NOTES
[address] [facility] Vaginal Delivery Progress Note    [address] [facility] Patient Status:  Inpatient    1985 MRN [id number]   [facility] 1SW-J Attending Alana Patricia MD, MD   [facility] Day # 3 PCP None Pcp     SUBJEC

## 2019-07-20 ENCOUNTER — TELEPHONE (OUTPATIENT)
Dept: OBGYN UNIT | Facility: HOSPITAL | Age: 34
End: 2019-07-20

## 2019-07-21 ENCOUNTER — TELEPHONE (OUTPATIENT)
Dept: OBGYN UNIT | Facility: HOSPITAL | Age: 34
End: 2019-07-21

## 2019-07-22 ENCOUNTER — NURSE ONLY (OUTPATIENT)
Dept: LACTATION | Facility: HOSPITAL | Age: 34
End: 2019-07-22
Attending: OBSTETRICS & GYNECOLOGY
Payer: COMMERCIAL

## 2019-07-22 VITALS — TEMPERATURE: 99 F

## 2019-07-22 PROCEDURE — 99213 OFFICE O/P EST LOW 20 MIN: CPT

## 2019-07-22 NOTE — PROGRESS NOTES
LACTATION NOTE - MOTHER      Evaluation Type: Outpatient Initial    Problems identified  Problems identified: Knowledge deficit; Unable to acheive sustained latch;Flat nipple(s);Milk supply WNL  Problems Identified Other: Baby unable to sustain latch to jericho

## 2019-07-30 ENCOUNTER — NURSE ONLY (OUTPATIENT)
Dept: LACTATION | Facility: HOSPITAL | Age: 34
End: 2019-07-30
Attending: OBSTETRICS & GYNECOLOGY
Payer: COMMERCIAL

## 2019-07-30 DIAGNOSIS — O92.79 POOR LATCH ON, POSTPARTUM: Primary | ICD-10-CM

## 2019-07-30 PROCEDURE — 99212 OFFICE O/P EST SF 10 MIN: CPT

## 2019-07-30 NOTE — PROGRESS NOTES
LACTATION NOTE - MOTHER      Evaluation Type: Outpatient Follow Up    Problems identified  Problems identified: Knowledge deficit;Milk supply WNL; Flat nipple(s)  Problems Identified Other: Baby is latching to breast with assist of nipple shield, good milk

## 2019-08-28 ENCOUNTER — TELEPHONE (OUTPATIENT)
Dept: LACTATION | Facility: HOSPITAL | Age: 34
End: 2019-08-28

## 2019-08-28 NOTE — TELEPHONE ENCOUNTER
Jennifer Gutierrez called [facility] for information regarding alternatives to giving her baby Poly Vi Sol vitamins. He is exclusively  and she states that the vitamins upset his stomach and make him very fussy and difficult to console.     I share

## 2019-10-21 ENCOUNTER — TELEPHONE (OUTPATIENT)
Dept: LACTATION | Facility: HOSPITAL | Age: 34
End: 2019-10-21

## 2019-10-21 NOTE — TELEPHONE ENCOUNTER
Issues Identified: (actual or potential)  Mom requesting information on cold medications safe to take while breastfeeding. Education Provided  Information provided on safety of Zicam and Dayquil.  Active ingredients in both medications looked up and revi

## 2020-10-02 PROBLEM — M51.37 OTHER INTERVERTEBRAL DISC DEGENERATION, LUMBOSACRAL REGION: Status: ACTIVE | Noted: 2020-10-02

## 2020-10-02 PROBLEM — M51.379 OTHER INTERVERTEBRAL DISC DEGENERATION, LUMBOSACRAL REGION: Status: ACTIVE | Noted: 2020-10-02

## 2020-10-02 PROBLEM — M51.27 OTHER INTERVERTEBRAL DISC DISPLACEMENT, LUMBOSACRAL REGION: Status: ACTIVE | Noted: 2020-10-02

## 2020-10-02 PROBLEM — M54.50 LOW BACK PAIN: Status: ACTIVE | Noted: 2020-10-02

## 2021-01-27 LAB
ANTIBODY SCREEN OB: NEGATIVE
HEPATITIS B SURFACE ANTIGEN OB: NEGATIVE
HIV RESULT OB: NEGATIVE
RAPID PLASMA REAGIN OB: NONREACTIVE
RH FACTOR OB: POSITIVE

## 2021-04-27 NOTE — PROGRESS NOTES
Outpatient Maternal-Fetal Medicine Consultation    Dear Dr. Az Horner,    Thank you for requesting ultrasound evaluation and maternal fetal medicine consultation on your patient Catherine Castro.   As you are aware she is a 38year old f Seasonal                      PHYSICAL EXAMINATION:  /82   Pulse 69   Ht 5' 7\" (1.702 m)   Wt 165 lb (74.8 kg)   LMP 12/13/2020   BMI 25.84 kg/m²   General: alert and oriented,no acute distress  Abdomen: gravid, soft, non-tender  Extremities: preeclampsia; hence, no further significant alterations in obstetric care are advised.     Medical Complications    Women 28years of age or older can expect to experience two to three fold higher rates of hospitalization,  delivery, and pregnancy-r older.    Fetal Malformations    Cardiac malformations, clubfoot, and diaphragmatic hernia appear to occur with increased frequency in offspring of older women.  These abnormalities are structural and unrelated to aneuploidy, thus they would not be detected previous pregnancy. LOW LYING PLACENTA:  Rates of  delivery and antepartum bleeding decrease as the distance between the placental edge and internal os increases.  There is a general consensus that there is a reasonable possibility of vaginal d the time (>50%) was spent in review of records, consultation and coordination of care. Our discussion is summarized above. The approximate physician face-to-face time was 40 minutes.

## 2021-05-04 ENCOUNTER — ULTRASOUND ENCOUNTER (OUTPATIENT)
Dept: PERINATAL CARE | Facility: HOSPITAL | Age: 36
End: 2021-05-04
Attending: OBSTETRICS & GYNECOLOGY
Payer: COMMERCIAL

## 2021-05-04 VITALS
BODY MASS INDEX: 25.9 KG/M2 | DIASTOLIC BLOOD PRESSURE: 82 MMHG | WEIGHT: 165 LBS | HEART RATE: 69 BPM | SYSTOLIC BLOOD PRESSURE: 121 MMHG | HEIGHT: 67 IN

## 2021-05-04 DIAGNOSIS — O09.812 PREGNANCY RESULTING FROM IN VITRO FERTILIZATION IN SECOND TRIMESTER: ICD-10-CM

## 2021-05-04 DIAGNOSIS — O09.522 MULTIGRAVIDA OF ADVANCED MATERNAL AGE IN SECOND TRIMESTER: ICD-10-CM

## 2021-05-04 DIAGNOSIS — O09.513 PRIMIGRAVIDA OF ADVANCED MATERNAL AGE IN THIRD TRIMESTER: ICD-10-CM

## 2021-05-04 DIAGNOSIS — O09.513 PRIMIGRAVIDA OF ADVANCED MATERNAL AGE IN THIRD TRIMESTER: Primary | ICD-10-CM

## 2021-05-04 DIAGNOSIS — Z87.19 HISTORY OF SMALL BOWEL OBSTRUCTION: ICD-10-CM

## 2021-05-04 PROCEDURE — 76811 OB US DETAILED SNGL FETUS: CPT | Performed by: OBSTETRICS & GYNECOLOGY

## 2021-05-04 PROCEDURE — 99243 OFF/OP CNSLTJ NEW/EST LOW 30: CPT | Performed by: OBSTETRICS & GYNECOLOGY

## 2021-07-14 NOTE — PROGRESS NOTES
Harsh Feldman     Dear Dr. Stan Paz,     Thank you for requesting ultrasound evaluation and maternal fetal medicine consultation on your patient Anna Morales.   As you are aware she is a 38year old female  with age and pregnancy. We discussed risks for fetal growth abnormalities, hypertensive complications, maternal hospitalizations,  mortality and  delivery. We discussed the plan of care and the rationale for the increased surveillance.   We re

## 2021-07-27 ENCOUNTER — OFFICE VISIT (OUTPATIENT)
Dept: PERINATAL CARE | Facility: HOSPITAL | Age: 36
End: 2021-07-27
Attending: OBSTETRICS & GYNECOLOGY
Payer: COMMERCIAL

## 2021-07-27 VITALS
HEIGHT: 67 IN | DIASTOLIC BLOOD PRESSURE: 63 MMHG | SYSTOLIC BLOOD PRESSURE: 114 MMHG | WEIGHT: 188 LBS | HEART RATE: 73 BPM | BODY MASS INDEX: 29.51 KG/M2

## 2021-07-27 DIAGNOSIS — Z03.72 SUSPECTED PROBLEM WITH PLACENTA NOT FOUND: ICD-10-CM

## 2021-07-27 DIAGNOSIS — Z87.19 HISTORY OF SMALL BOWEL OBSTRUCTION: ICD-10-CM

## 2021-07-27 DIAGNOSIS — O44.42 LOW-LYING PLACENTA IN SECOND TRIMESTER: ICD-10-CM

## 2021-07-27 DIAGNOSIS — O09.523 MULTIGRAVIDA OF ADVANCED MATERNAL AGE IN THIRD TRIMESTER: Primary | ICD-10-CM

## 2021-07-27 DIAGNOSIS — O09.513 PRIMIGRAVIDA OF ADVANCED MATERNAL AGE IN THIRD TRIMESTER: ICD-10-CM

## 2021-07-27 DIAGNOSIS — O09.523 MULTIGRAVIDA OF ADVANCED MATERNAL AGE IN THIRD TRIMESTER: ICD-10-CM

## 2021-07-27 PROCEDURE — 76817 TRANSVAGINAL US OBSTETRIC: CPT

## 2021-07-27 PROCEDURE — 76819 FETAL BIOPHYS PROFIL W/O NST: CPT

## 2021-07-27 PROCEDURE — 76816 OB US FOLLOW-UP PER FETUS: CPT | Performed by: OBSTETRICS & GYNECOLOGY

## 2021-07-27 PROCEDURE — 99213 OFFICE O/P EST LOW 20 MIN: CPT | Performed by: OBSTETRICS & GYNECOLOGY

## 2021-07-27 PROCEDURE — 76819 FETAL BIOPHYS PROFIL W/O NST: CPT | Performed by: OBSTETRICS & GYNECOLOGY

## 2021-07-27 PROCEDURE — 76817 TRANSVAGINAL US OBSTETRIC: CPT | Performed by: OBSTETRICS & GYNECOLOGY

## 2021-08-27 LAB
HIV RESULT OB: NEGATIVE
STREP GP B CULT OB: NEGATIVE

## 2021-09-17 ENCOUNTER — ANESTHESIA EVENT (OUTPATIENT)
Dept: OBGYN UNIT | Facility: HOSPITAL | Age: 36
End: 2021-09-17
Payer: COMMERCIAL

## 2021-09-17 ENCOUNTER — APPOINTMENT (OUTPATIENT)
Dept: OBGYN CLINIC | Facility: HOSPITAL | Age: 36
End: 2021-09-17
Payer: COMMERCIAL

## 2021-09-17 ENCOUNTER — ANESTHESIA (OUTPATIENT)
Dept: OBGYN UNIT | Facility: HOSPITAL | Age: 36
End: 2021-09-17
Payer: COMMERCIAL

## 2021-09-17 ENCOUNTER — HOSPITAL ENCOUNTER (INPATIENT)
Facility: HOSPITAL | Age: 36
LOS: 1 days | Discharge: HOME | End: 2021-09-18
Attending: OBSTETRICS & GYNECOLOGY | Admitting: OBSTETRICS & GYNECOLOGY
Payer: COMMERCIAL

## 2021-09-17 LAB
ANTIBODY SCREEN: NEGATIVE
BASOPHILS # BLD AUTO: 0.05 X10(3) UL (ref 0–0.2)
BASOPHILS NFR BLD AUTO: 0.4 %
EOSINOPHIL # BLD AUTO: 0.3 X10(3) UL (ref 0–0.7)
EOSINOPHIL NFR BLD AUTO: 2.3 %
ERYTHROCYTE [DISTWIDTH] IN BLOOD BY AUTOMATED COUNT: 11.8 %
HCT VFR BLD AUTO: 39 %
HGB BLD-MCNC: 13.4 G/DL
IMM GRANULOCYTES # BLD AUTO: 0.07 X10(3) UL (ref 0–1)
IMM GRANULOCYTES NFR BLD: 0.5 %
LYMPHOCYTES # BLD AUTO: 3.95 X10(3) UL (ref 1–4)
LYMPHOCYTES NFR BLD AUTO: 30.1 %
MCH RBC QN AUTO: 31.8 PG (ref 26–34)
MCHC RBC AUTO-ENTMCNC: 34.4 G/DL (ref 31–37)
MCV RBC AUTO: 92.6 FL
MONOCYTES # BLD AUTO: 1.09 X10(3) UL (ref 0.1–1)
MONOCYTES NFR BLD AUTO: 8.3 %
NEUTROPHILS # BLD AUTO: 7.68 X10 (3) UL (ref 1.5–7.7)
NEUTROPHILS # BLD AUTO: 7.68 X10(3) UL (ref 1.5–7.7)
NEUTROPHILS NFR BLD AUTO: 58.4 %
PLATELET # BLD AUTO: 235 10(3)UL (ref 150–450)
RBC # BLD AUTO: 4.21 X10(6)UL
RH BLOOD TYPE: POSITIVE
SARS-COV-2 RNA RESP QL NAA+PROBE: NOT DETECTED
T PALLIDUM AB SER QL IA: NONREACTIVE
WBC # BLD AUTO: 13.1 X10(3) UL (ref 4–11)

## 2021-09-17 PROCEDURE — 99214 OFFICE O/P EST MOD 30 MIN: CPT

## 2021-09-17 PROCEDURE — 86780 TREPONEMA PALLIDUM: CPT | Performed by: OBSTETRICS & GYNECOLOGY

## 2021-09-17 PROCEDURE — 86901 BLOOD TYPING SEROLOGIC RH(D): CPT | Performed by: OBSTETRICS & GYNECOLOGY

## 2021-09-17 PROCEDURE — 85025 COMPLETE CBC W/AUTO DIFF WBC: CPT | Performed by: OBSTETRICS & GYNECOLOGY

## 2021-09-17 PROCEDURE — 86900 BLOOD TYPING SEROLOGIC ABO: CPT | Performed by: OBSTETRICS & GYNECOLOGY

## 2021-09-17 PROCEDURE — 86850 RBC ANTIBODY SCREEN: CPT | Performed by: OBSTETRICS & GYNECOLOGY

## 2021-09-17 RX ORDER — ACETAMINOPHEN 500 MG
500 TABLET ORAL EVERY 6 HOURS PRN
Status: DISCONTINUED | OUTPATIENT
Start: 2021-09-17 | End: 2021-09-17 | Stop reason: HOSPADM

## 2021-09-17 RX ORDER — TERBUTALINE SULFATE 1 MG/ML
0.25 INJECTION, SOLUTION SUBCUTANEOUS AS NEEDED
Status: DISCONTINUED | OUTPATIENT
Start: 2021-09-17 | End: 2021-09-17 | Stop reason: HOSPADM

## 2021-09-17 RX ORDER — IBUPROFEN 600 MG/1
600 TABLET ORAL EVERY 6 HOURS
Status: DISCONTINUED | OUTPATIENT
Start: 2021-09-17 | End: 2021-09-18

## 2021-09-17 RX ORDER — ONDANSETRON 2 MG/ML
4 INJECTION INTRAMUSCULAR; INTRAVENOUS EVERY 6 HOURS PRN
Status: DISCONTINUED | OUTPATIENT
Start: 2021-09-17 | End: 2021-09-17 | Stop reason: HOSPADM

## 2021-09-17 RX ORDER — ACETAMINOPHEN 325 MG/1
650 TABLET ORAL EVERY 6 HOURS PRN
Status: DISCONTINUED | OUTPATIENT
Start: 2021-09-17 | End: 2021-09-18

## 2021-09-17 RX ORDER — SIMETHICONE 80 MG
80 TABLET,CHEWABLE ORAL 3 TIMES DAILY PRN
Status: DISCONTINUED | OUTPATIENT
Start: 2021-09-17 | End: 2021-09-18

## 2021-09-17 RX ORDER — IBUPROFEN 600 MG/1
600 TABLET ORAL EVERY 6 HOURS PRN
Status: DISCONTINUED | OUTPATIENT
Start: 2021-09-17 | End: 2021-09-17 | Stop reason: HOSPADM

## 2021-09-17 RX ORDER — AMMONIA INHALANTS 0.04 G/.3ML
0.3 INHALANT RESPIRATORY (INHALATION) AS NEEDED
Status: DISCONTINUED | OUTPATIENT
Start: 2021-09-17 | End: 2021-09-17 | Stop reason: HOSPADM

## 2021-09-17 RX ORDER — BISACODYL 10 MG
10 SUPPOSITORY, RECTAL RECTAL ONCE AS NEEDED
Status: DISCONTINUED | OUTPATIENT
Start: 2021-09-17 | End: 2021-09-18

## 2021-09-17 RX ORDER — NALBUPHINE HCL 10 MG/ML
2.5 AMPUL (ML) INJECTION
Status: DISCONTINUED | OUTPATIENT
Start: 2021-09-17 | End: 2021-09-17

## 2021-09-17 RX ORDER — SODIUM CHLORIDE, SODIUM LACTATE, POTASSIUM CHLORIDE, CALCIUM CHLORIDE 600; 310; 30; 20 MG/100ML; MG/100ML; MG/100ML; MG/100ML
INJECTION, SOLUTION INTRAVENOUS CONTINUOUS
Status: DISCONTINUED | OUTPATIENT
Start: 2021-09-17 | End: 2021-09-17 | Stop reason: HOSPADM

## 2021-09-17 RX ORDER — DOCUSATE SODIUM 100 MG/1
100 CAPSULE, LIQUID FILLED ORAL
Status: DISCONTINUED | OUTPATIENT
Start: 2021-09-17 | End: 2021-09-18

## 2021-09-17 RX ORDER — TRISODIUM CITRATE DIHYDRATE AND CITRIC ACID MONOHYDRATE 500; 334 MG/5ML; MG/5ML
30 SOLUTION ORAL AS NEEDED
Status: DISCONTINUED | OUTPATIENT
Start: 2021-09-17 | End: 2021-09-17 | Stop reason: HOSPADM

## 2021-09-17 RX ORDER — DEXTROSE, SODIUM CHLORIDE, SODIUM LACTATE, POTASSIUM CHLORIDE, AND CALCIUM CHLORIDE 5; .6; .31; .03; .02 G/100ML; G/100ML; G/100ML; G/100ML; G/100ML
INJECTION, SOLUTION INTRAVENOUS AS NEEDED
Status: DISCONTINUED | OUTPATIENT
Start: 2021-09-17 | End: 2021-09-17 | Stop reason: HOSPADM

## 2021-09-17 RX ORDER — ZOLPIDEM TARTRATE 5 MG/1
5 TABLET ORAL NIGHTLY PRN
Status: DISCONTINUED | OUTPATIENT
Start: 2021-09-17 | End: 2021-09-18

## 2021-09-17 RX ORDER — BUPIVACAINE HCL/0.9 % NACL/PF 0.25 %
5 PLASTIC BAG, INJECTION (ML) EPIDURAL AS NEEDED
Status: DISCONTINUED | OUTPATIENT
Start: 2021-09-17 | End: 2021-09-17

## 2021-09-17 NOTE — PROGRESS NOTES
Pt is a 38year old female admitted to TRG4/TRG4-A. Patient presents with:  Srom: Clear fluid at 0400. Positive fetal movement. Pt is  39w5d intra-uterine pregnancy. History obtained. Oriented to room, staff, and plan of care.

## 2021-09-17 NOTE — PROGRESS NOTES
Report given to [facility]. Pt transferred to room 113 in stable condition for management of labor.

## 2021-09-17 NOTE — H&P
[facility]    PATIENT'S NAME: Caren Palma   ATTENDING PHYSICIAN: Kassy Curry M.D.    PATIENT ACCOUNT#:   [de-identified]    LOCATION:  [address] [place] [address]  MEDICAL RECORD #:   [id number]       YOB: 1985  ADMISSION DATE: IVF pregnancy, advanced maternal age, induction of labor with Cervidil, second stage 39 minutes; her son delivered at [facility] by myself, Dr. Joel Veliz. In 2021, present pregnancy. Patient is noted to be O positive. Antibody screen negative x2.   H

## 2021-09-17 NOTE — PROGRESS NOTES
Patient transferred to  room 2217 via wheelchair while holding infant in stable condition. Patient is a/o x3. Patient has all her belongings with her at the bedside. Patient void 200.  Infant bands read in [facility] room 2217 with [facility] RN

## 2021-09-17 NOTE — ANESTHESIA PROCEDURE NOTES
Labor Analgesia  Performed by: Sabina Goins MD  Authorized by: Sabina Goins MD       General Information and Staff    Start Time:  9/17/2021 8:02 AM  End Time:  9/17/2021 8:10 AM  Anesthesiologist:  Sabina Goins MD  Performed by:   Anesthesiologist  Rachel

## 2021-09-17 NOTE — ANESTHESIA PREPROCEDURE EVALUATION
PRE-OP EVALUATION    Patient Name: Rachna Novoa    Admit Diagnosis: preg state  Pregnancy    Pre-op Diagnosis: * No surgery found *        Anesthesia Procedure: LABOR ANALGESIA    * Surgery not found *    Pre-op vitals reviewed.   Temp: 98 °F (36.7 Unknown at Unknown time        Allergies: Bananas, Melons, and Seasonal      Anesthesia Evaluation    Patient summary reviewed. Anesthetic Complications  (-) history of anesthetic complications         GI/Hepatic/Renal    Negative GI/hepatic/renal ROS. pruritus, as well as other serious but rare complications including PPDH, infection, epidural hematoma, nerve/cord injury. Patient verbalizes understanding of expectations and risk. All questions were answered.      Plan/risks discussed with: patient and sp

## 2021-09-17 NOTE — PLAN OF CARE
Problem: Patient/Family Goals  Goal: Patient/Family Long Term Goal  Description: Patient's Long Term Goal: Safe vaginal delivery     Interventions:  -   - See additional Care Plan goals for specific interventions  Outcome: Progressing  Goal: Patient/Fami

## 2021-09-17 NOTE — L&D DELIVERY NOTE
Matthew, Girl [[id number]]    Labor Events     labor?: No   steroids?: None  Antibiotics received during labor?: No  Rupture date/time: 2021 040     Rupture type: SROM  Fluid color: Clear  Augmentation: Oxytocin     Labor Event T date/time: 9/17/2021 1427  Skin to skin with:  Mother     Vaginal Count    Initial count RN: Sharonda Darling RN   Sponges   Sharps    Initial counts 11   0    Final counts 11   0    Final count RN: Sharonda Darling RN  Final count MD: MD Aiyana Rachel

## 2021-09-18 VITALS
WEIGHT: 202 LBS | HEIGHT: 67 IN | TEMPERATURE: 98 F | HEART RATE: 63 BPM | BODY MASS INDEX: 31.71 KG/M2 | DIASTOLIC BLOOD PRESSURE: 67 MMHG | SYSTOLIC BLOOD PRESSURE: 113 MMHG | OXYGEN SATURATION: 92 % | RESPIRATION RATE: 18 BRPM

## 2021-09-18 PROBLEM — Z34.90 PREGNANCY (HCC): Status: RESOLVED | Noted: 2019-07-14 | Resolved: 2021-09-18

## 2021-09-18 PROBLEM — Z34.90 PREGNANCY: Status: RESOLVED | Noted: 2019-07-14 | Resolved: 2021-09-18

## 2021-09-18 LAB
BASOPHILS # BLD AUTO: 0.03 X10(3) UL (ref 0–0.2)
BASOPHILS NFR BLD AUTO: 0.2 %
EOSINOPHIL # BLD AUTO: 0.17 X10(3) UL (ref 0–0.7)
EOSINOPHIL NFR BLD AUTO: 1.3 %
ERYTHROCYTE [DISTWIDTH] IN BLOOD BY AUTOMATED COUNT: 11.9 %
HCT VFR BLD AUTO: 34.4 %
HGB BLD-MCNC: 11.4 G/DL
IMM GRANULOCYTES # BLD AUTO: 0.07 X10(3) UL (ref 0–1)
IMM GRANULOCYTES NFR BLD: 0.6 %
LYMPHOCYTES # BLD AUTO: 2.84 X10(3) UL (ref 1–4)
LYMPHOCYTES NFR BLD AUTO: 22.5 %
MCH RBC QN AUTO: 31.2 PG (ref 26–34)
MCHC RBC AUTO-ENTMCNC: 33.1 G/DL (ref 31–37)
MCV RBC AUTO: 94.2 FL
MONOCYTES # BLD AUTO: 0.96 X10(3) UL (ref 0.1–1)
MONOCYTES NFR BLD AUTO: 7.6 %
NEUTROPHILS # BLD AUTO: 8.55 X10 (3) UL (ref 1.5–7.7)
NEUTROPHILS # BLD AUTO: 8.55 X10(3) UL (ref 1.5–7.7)
NEUTROPHILS NFR BLD AUTO: 67.8 %
PLATELET # BLD AUTO: 200 10(3)UL (ref 150–450)
RBC # BLD AUTO: 3.65 X10(6)UL
WBC # BLD AUTO: 12.6 X10(3) UL (ref 4–11)

## 2021-09-18 PROCEDURE — 85025 COMPLETE CBC W/AUTO DIFF WBC: CPT | Performed by: OBSTETRICS & GYNECOLOGY

## 2021-09-18 NOTE — PROGRESS NOTES
[facility]  Post-Partum Vaginal Delivery Progress Note    John Paul Johnson Patient Status:  Inpatient    1985 MRN [id number]   [facility] 2SW-J Attending Virginia Loco Day # 1 PCP None Pcp     SUBJECTIVE:

## 2021-09-18 NOTE — PLAN OF CARE
Problem: Patient/Family Goals  Goal: Patient/Family Long Term Goal  Description: Patient's Long Term Goal: Safe vaginal delivery     Interventions:  -   - See additional Care Plan goals for specific interventions  9/17/2021 2011 by ISELA Noble

## 2021-09-18 NOTE — PLAN OF CARE
Problem: Patient/Family Goals  Goal: Patient/Family Long Term Goal  Description: Patient's Long Term Goal: Safe vaginal delivery     Interventions:  -   - See additional Care Plan goals for specific interventions  Outcome: Progressing  Goal: Patient/Fami pt frequently for physical needs  - Identify cognitive and physical deficits and behaviors that affect risk of falls.   - [place] fall precautions as indicated by assessment.  - Educate pt/family on patient safety including physical limitations  - Instruc

## 2021-09-18 NOTE — PROGRESS NOTES
Labor Analgesia Follow Up Note    Patient underwent epidural anesthesia for labor analgesia,    Placenta Date/Time: 9/17/2021  2:13 PM    Delivery Date/Time[de-identified] 9/17/2021  2:08 PM    /75 (BP Location: Right arm)   Pulse 69   Temp 97.7 °F (36.5 °C) (Ora

## 2021-09-18 NOTE — PLAN OF CARE
Problem: Patient/Family Goals  Goal: Patient/Family Long Term Goal  Description: Patient's Long Term Goal: Safe vaginal delivery     Interventions:  -   - See additional Care Plan goals for specific interventions  9/18/2021 1679 by Charanjit Rodriguze RN effects  - Notify MD/LIP if interventions unsuccessful or patient reports new pain  - Anticipate increased pain with activity and pre-medicate as appropriate  9/18/2021 1358 by Carlo Bradford RN  Outcome: Completed  9/18/2021 1004 by Carlo Bradford R

## 2021-09-18 NOTE — L&D DELIVERY NOTE
[facility]    PATIENT'S NAME: Angeline Dobsons   ATTENDING PHYSICIAN: Ashlee Ibanez M.D.    PATIENT ACCOUNT #: [de-identified] LOCATION:  [address] [place] [address]   MEDICAL RECORD #: [id number] YOB: 1985   ADMISSION DATE: 09/17/2021 D

## 2021-09-21 ENCOUNTER — TELEPHONE (OUTPATIENT)
Dept: OBGYN UNIT | Facility: HOSPITAL | Age: 36
End: 2021-09-21

## 2021-09-21 ENCOUNTER — NURSE ONLY (OUTPATIENT)
Dept: LACTATION | Facility: HOSPITAL | Age: 36
End: 2021-09-21
Attending: OBSTETRICS & GYNECOLOGY
Payer: COMMERCIAL

## 2021-09-21 DIAGNOSIS — O92.79 ENGORGEMENT OF BREASTS, POSTPARTUM CONDITION OR COMPLICATION: Primary | ICD-10-CM

## 2021-09-21 DIAGNOSIS — O92.29 SORE NIPPLES DUE TO LACTATION: ICD-10-CM

## 2021-09-21 PROCEDURE — 99213 OFFICE O/P EST LOW 20 MIN: CPT

## 2021-09-21 NOTE — PROGRESS NOTES
LACTATION NOTE - MOTHER      Evaluation Type: Outpatient Initial    Problems identified  Problems identified: Knowledge deficit; Milk supply WNL; Nipple pain;  Nipple trauma  Problems Identified Other: Baby born at 38w11d GA    Maternal history  Maternal his notes that infant is slipping down the nipple due to breast firmness and fullness. Discussed engorgement and sore nipple management. Baby nursed well after position change to approach breast with deeper latch. Mother verbalized that it was comfortable.

## 2021-09-22 ENCOUNTER — TELEPHONE (OUTPATIENT)
Dept: OBGYN UNIT | Facility: HOSPITAL | Age: 36
End: 2021-09-22

## 2022-11-01 ENCOUNTER — WALK IN (OUTPATIENT)
Dept: URGENT CARE | Age: 37
End: 2022-11-01

## 2022-11-01 VITALS
OXYGEN SATURATION: 97 % | DIASTOLIC BLOOD PRESSURE: 65 MMHG | SYSTOLIC BLOOD PRESSURE: 118 MMHG | RESPIRATION RATE: 16 BRPM | TEMPERATURE: 97.9 F | HEART RATE: 89 BPM

## 2022-11-01 DIAGNOSIS — J45.901 EXACERBATION OF ASTHMA, UNSPECIFIED ASTHMA SEVERITY, UNSPECIFIED WHETHER PERSISTENT: Primary | ICD-10-CM

## 2022-11-01 DIAGNOSIS — J01.90 ACUTE SINUSITIS, RECURRENCE NOT SPECIFIED, UNSPECIFIED LOCATION: ICD-10-CM

## 2022-11-01 PROCEDURE — 99213 OFFICE O/P EST LOW 20 MIN: CPT | Performed by: REGISTERED NURSE

## 2022-11-01 RX ORDER — FLUTICASONE FUROATE 200 UG/1
POWDER RESPIRATORY (INHALATION)
COMMUNITY
Start: 2022-10-18

## 2022-11-01 RX ORDER — AMOXICILLIN AND CLAVULANATE POTASSIUM 875; 125 MG/1; MG/1
1 TABLET, FILM COATED ORAL 2 TIMES DAILY
Qty: 14 TABLET | Refills: 0 | Status: SHIPPED | OUTPATIENT
Start: 2022-11-01 | End: 2022-11-08

## 2022-11-01 RX ORDER — PREDNISONE 10 MG/1
10 TABLET ORAL DAILY
Qty: 25 TABLET | Refills: 0 | Status: SHIPPED | OUTPATIENT
Start: 2022-11-01 | End: 2022-11-06

## 2022-11-01 ASSESSMENT — ENCOUNTER SYMPTOMS
CHILLS: 0
RHINORRHEA: 1
LIGHT-HEADEDNESS: 0
WHEEZING: 1
HEADACHES: 1
FATIGUE: 0
SORE THROAT: 0
FEVER: 0
CHEST TIGHTNESS: 1
SHORTNESS OF BREATH: 1
EYES NEGATIVE: 1
GASTROINTESTINAL NEGATIVE: 1
SINUS PAIN: 1
COUGH: 1
DIZZINESS: 0

## 2023-04-28 ENCOUNTER — WALK IN (OUTPATIENT)
Dept: URGENT CARE | Age: 38
End: 2023-04-28

## 2023-04-28 VITALS
HEIGHT: 67 IN | DIASTOLIC BLOOD PRESSURE: 78 MMHG | OXYGEN SATURATION: 95 % | WEIGHT: 160 LBS | HEART RATE: 79 BPM | RESPIRATION RATE: 18 BRPM | BODY MASS INDEX: 25.11 KG/M2 | SYSTOLIC BLOOD PRESSURE: 122 MMHG | TEMPERATURE: 98.6 F

## 2023-04-28 DIAGNOSIS — J32.0 MAXILLARY SINUSITIS, UNSPECIFIED CHRONICITY: Primary | ICD-10-CM

## 2023-04-28 PROCEDURE — 99213 OFFICE O/P EST LOW 20 MIN: CPT | Performed by: NURSE PRACTITIONER

## 2023-04-28 RX ORDER — AMOXICILLIN AND CLAVULANATE POTASSIUM 875; 125 MG/1; MG/1
1 TABLET, FILM COATED ORAL EVERY 12 HOURS
Qty: 20 TABLET | Refills: 0 | Status: SHIPPED | OUTPATIENT
Start: 2023-04-28 | End: 2023-05-08

## 2023-04-28 ASSESSMENT — ENCOUNTER SYMPTOMS
RESPIRATORY NEGATIVE: 1
CHILLS: 0
DIZZINESS: 0
SORE THROAT: 1
HEADACHES: 1
SINUS PRESSURE: 1
FATIGUE: 1
EYES NEGATIVE: 1
FEVER: 0
LIGHT-HEADEDNESS: 0
DIAPHORESIS: 0

## 2023-12-28 ENCOUNTER — V-VISIT (OUTPATIENT)
Dept: URGENT CARE | Age: 38
End: 2023-12-28

## 2023-12-28 VITALS
WEIGHT: 174.71 LBS | RESPIRATION RATE: 18 BRPM | TEMPERATURE: 97.7 F | HEIGHT: 67 IN | BODY MASS INDEX: 27.42 KG/M2 | OXYGEN SATURATION: 99 % | HEART RATE: 71 BPM | SYSTOLIC BLOOD PRESSURE: 126 MMHG | DIASTOLIC BLOOD PRESSURE: 88 MMHG

## 2023-12-28 DIAGNOSIS — J01.00 ACUTE NON-RECURRENT MAXILLARY SINUSITIS: Primary | ICD-10-CM

## 2023-12-28 PROCEDURE — 99212 OFFICE O/P EST SF 10 MIN: CPT | Performed by: NURSE PRACTITIONER

## 2023-12-28 RX ORDER — AMOXICILLIN AND CLAVULANATE POTASSIUM 875; 125 MG/1; MG/1
1 TABLET, FILM COATED ORAL 2 TIMES DAILY
Qty: 10 TABLET | Refills: 0 | Status: SHIPPED | OUTPATIENT
Start: 2023-12-28 | End: 2024-01-02

## 2023-12-28 RX ORDER — AMOXICILLIN AND CLAVULANATE POTASSIUM 875; 125 MG/1; MG/1
1 TABLET, FILM COATED ORAL 2 TIMES DAILY
Qty: 10 TABLET | Refills: 0 | Status: SHIPPED | OUTPATIENT
Start: 2023-12-28 | End: 2023-12-28 | Stop reason: SDUPTHER

## 2023-12-28 ASSESSMENT — ENCOUNTER SYMPTOMS
SORE THROAT: 0
FEVER: 1
RESPIRATORY NEGATIVE: 1
SINUS PAIN: 1
CHILLS: 1

## 2023-12-29 ENCOUNTER — APPOINTMENT (OUTPATIENT)
Dept: URGENT CARE | Age: 38
End: 2023-12-29

## 2024-02-06 LAB
HEPATITIS B SURFACE ANTIGEN OB: NEGATIVE
HIV RESULT OB: NEGATIVE

## 2024-02-09 LAB — AMB EXT TREPONEMAL ANTIBODIES: NONREACTIVE

## 2024-04-26 ENCOUNTER — OFFICE VISIT (OUTPATIENT)
Dept: PERINATAL CARE | Facility: HOSPITAL | Age: 39
End: 2024-04-26
Attending: OBSTETRICS & GYNECOLOGY
Payer: COMMERCIAL

## 2024-04-26 VITALS
BODY MASS INDEX: 28.72 KG/M2 | WEIGHT: 183 LBS | SYSTOLIC BLOOD PRESSURE: 114 MMHG | DIASTOLIC BLOOD PRESSURE: 73 MMHG | HEIGHT: 67 IN | HEART RATE: 66 BPM

## 2024-04-26 DIAGNOSIS — O09.819 ENCOUNTER FOR SUPERVISION OF PREGNANCY RESULTING FROM ASSISTED REPRODUCTIVE TECHNOLOGY (HCC): ICD-10-CM

## 2024-04-26 DIAGNOSIS — O09.819 ENCOUNTER FOR SUPERVISION OF PREGNANCY RESULTING FROM ASSISTED REPRODUCTIVE TECHNOLOGY (HCC): Primary | ICD-10-CM

## 2024-04-26 DIAGNOSIS — O09.529 AMA (ADVANCED MATERNAL AGE) MULTIGRAVIDA 35+ (HCC): ICD-10-CM

## 2024-04-26 DIAGNOSIS — O09.522 MULTIGRAVIDA OF ADVANCED MATERNAL AGE IN SECOND TRIMESTER (HCC): ICD-10-CM

## 2024-04-26 PROCEDURE — 76811 OB US DETAILED SNGL FETUS: CPT | Performed by: OBSTETRICS & GYNECOLOGY

## 2024-04-26 RX ORDER — ASPIRIN 81 MG/1
162 TABLET ORAL DAILY
COMMUNITY

## 2024-04-26 NOTE — PROGRESS NOTES
Outpatient Maternal-Fetal Medicine Consultation    Dear Dr. Hsu,    Thank you for requesting ultrasound evaluation and maternal fetal medicine consultation on your patient Libertad Castro.  As you are aware she is a 38 year old female with a Gurrola pregnancy at 20w0d.  A maternal-fetal medicine consultation was requested secondary to advanced maternal age and IVF pregnancy.  Her prenatal records and labs were reviewed.    This is a frozen embryo transfer and the eggs were retrieved when she was 32 years old.  There was no preimplantation genetic screening.  She declined cell free DNA screening.  Genetic testing also declined.    HISTORY  OB History    Para Term  AB Living   5 2 2 0 2 2   SAB IAB Ectopic Multiple Live Births   2 0 0 0 2     # 1 - Date: 2018, Sex: None, Weight: None, GA: None, Type: Spontaneous , Apgar1: None, Apgar5: None, Living: None, Birth Comments: None    # 2 - Date: 07/15/19, Sex: Male, Weight: 7 lb 1.6 oz (3.22 kg), GA: 40w2d, Type: Normal spontaneous vaginal delivery, Apgar1: 9, Apgar5: 9, Living: Living, Birth Comments: None    # 3 - Date: 21, Sex: Female, Weight: 7 lb 10.4 oz (3.47 kg), GA: 39w5d, Type: Normal spontaneous vaginal delivery, Apgar1: 9, Apgar5: 9, Living: Living, Birth Comments: None    # 4 - Date: 23, Sex: None, Weight: None, GA: 6w0d, Type: Spontaneous , Apgar1: None, Apgar5: None, Living: None, Birth Comments: None    # 5 - Date: None, Sex: None, Weight: None, GA: None, Type: None, Apgar1: None, Apgar5: None, Living: None, Birth Comments: None    Past Medical History  The patient  has a past medical history of Abdominal adhesions, Asthma (HCC), Back problem, Malrotation of intestine (HCC), Premature baby (HCC), and Premature birth (HCC).    She has no past medical history of Anesthesia complication or Difficult intubation.    Past Surgical History  The patient  has a past surgical history that includes other;  appendectomy; other surgical history (05/2017); and Breast lumpectomy.    Family History  The patient She indicated that her mother is alive. She indicated that her father is alive.    Medications:   Current Outpatient Medications:     aspirin 81 MG Oral Tab EC, Take 2 tablets (162 mg total) by mouth daily., Disp: , Rfl:     cholecalciferol (VITAMIN D3) 125 MCG (5000 UT) Oral Cap, Take 1 capsule (5,000 Units total) by mouth daily., Disp: , Rfl:     prenatal multivitamin plus DHA 27-0.8-228 MG Oral Cap, Take 1 capsule by mouth daily., Disp: , Rfl:     Levocetirizine Dihydrochloride (XYZAL OR), Take 1 tablet by mouth daily.  , Disp: , Rfl:     ALBUTEROL SULFATE, PRN, Disp: , Rfl:     ibuprofen 600 MG Oral Tab, Take 1 tablet (600 mg total) by mouth every 6 (six) hours as needed for Fever., Disp: 20 tablet, Rfl: 0    SYMBICORT IN, 2 puffs once daily (Patient not taking: Reported on 4/26/2024), Disp: , Rfl:   Allergies:   Allergies   Allergen Reactions    Bananas     Melons     Seasonal        PHYSICAL EXAMINATION:  /73 (BP Location: Right arm, Patient Position: Sitting, Cuff Size: adult)   Pulse 66   Ht 5' 7\" (1.702 m)   Wt 183 lb (83 kg)   BMI 28.66 kg/m²   General: alert and oriented,no acute distress  Abdomen: gravid, soft, non-tender  Extremities: non-tender, no edema      OBSTETRIC ULTRASOUND  The patient had a level 2 ultrasound today which I interpreted the results and reviewed them with the patient.    Ultrasound Findings:  Single IUP in cephalic presentation.    Placenta is anterior.   A 3 vessel cord is noted.  Cardiac activity is present at 146 bpm   g ( 0 lb 12 oz);   MVP is 5.7 cm .     The fetal measurements are consistent with the established EDC. No ultrasound evidence of structural abnormalities are seen today. The nasal bone is present. No ultrasound evidence of markers for aneuploidy are seen. She understands that ultrasound exam cannot exclude genetic abnormalities and that genetic  testing is recommended. The limitations of ultrasound were discussed.     Uterus and adnexa appeared normal  today on US    See imaging tab for the complete US report.    DISCUSSION  During her visit we discussed and reviewed the following issues:  Assisted Reproductive technology -   Conception by IVF is associated with an increased incidence of several obstetrical and  complications. Most of these are related to the high incidence of multiple gestations. The precise reasons for this increase in adverse outcomes are not clear.    ART is associated with an up to two-fold increased risk of  birth and low birth weight in gurrola pregnancies. ART does not appear to be an independent risk factor for adverse neurodevelopment outcome. ART appears to be at increased risk of delivering offspring with congenital malformations compared with fertile women who conceive naturally. Heart defects have been reported as high at 6 % so fetal echocardiography is recommended in all IVF patients. Stillbirth and  mortality rates appear to be increased as much as four-fold.    Gurrola ART pregnancies, the relative risk of common pregnancy complications such as fetal growth restriction, preeclampsia, prematurity and  mortality are increased.    ADVANCED MATERNAL AGE    Background  I reviewed with the patient that pregnancies in women of advanced maternal age (35 or older at delivery) are associated with elevated risks. Specifically, there is a higher rate of:  Fetal malformations  Preeclampsia  Gestational diabetes  Intrauterine fetal death    As a result, enhanced pregnancy surveillance is advised for these patients including a comprehensive ultrasound to assess for fetal malformations (at 20 weeks) and a third trimester ultrasound assessment for fetal growth (at 32 weeks). In addition, weekly NST's (initiating at 36 weeks gestation for women 35-39 years and at 32 weeks gestation for women 40 years  and older) are also advised. Routine obstetric care is more than adequate to assess for gestational diabetes and preeclampsia; hence, no further significant alterations in obstetric care are advised.    Medical Complications    Women 35 years of age or older can expect to experience two to three fold higher rates of hospitalization,  delivery, and pregnancy-related complications when compared to their younger counterparts.  The two most common medical problems complicating these  pregnanccies are hypertension and diabetes.   The incidence of preeclampsia in the general obstetric population is 3 to 4 percent; this increases to 5 to 10 percent in women over age 40 and is as high as 35 percent in women over age 50.   The incidence of gestational diabetes in the general obstetric population is 3 percent, rising to 7 to 12 percent in women over age 40 and 20 percent in women over age 50.  Women 35 years of age or older are more likely to be delivered by . The  delivery rate in the general obstetric population of the [place] is almost 30 percent, compared to almost 50 percent in women over age 40 to 45 and almost 80 percent in women age 50 to 63.          Fetal Death        A decision analysis tool using data from the [employer]  [employer] predicted a strategy of weekly antepartum testing and labor induction would lower the risk of unexplained fetal death in women 35 years of age or older. In this model, weekly testing starting at 36 weeks of gestation would drop the risk of fetal death from 5.2 to 1.3 per 1000 pregnancies. While a policy of antepartum testing in older women does increase the chance that a women will be induced (71 inductions per fetal death averted) and thereby increases her risk of having a  delivery, only 14 additional cesareans would need to be performed to avert one unexplained fetal death.  Hence, weekly NST's are advised for women of advanced  maternal age; testing should be initiated at 36 weeks for women 35-39 years and at 32 weeks for women 40 years and older.    Fetal Malformations    Cardiac malformations, clubfoot, and diaphragmatic hernia appear to occur with increased frequency in offspring of older women. These abnormalities are structural and unrelated to aneuploidy, thus they would not be detected by karyotype analysis.  For these reasons a complete, detailed ultrasound (level II) is advised even if the fetus has a normal karyotype.      Fetal Aneuploidy  We also discussed the increased risk of chromosomal abnormalities associated with advanced maternal age. I reviewed that an ultrasound examination cannot reliably exclude potential genetic abnormalities. Given that the patient will be 38 years old at the time of delivery I reviewed that her risk (at amniocentesis) of having a fetus with any chromosome abnormality is 1:40 and with trisomy 21 is 1: 70.    Invasive Testing  I offered invasive genetic testing (amniocentesis, chorionic villus sampling) after reviewing the diagnostic accuracy of these tests as well as the procedure associated loss rate (1:500 for genetic amniocentesis).    She ultimately does not desire invasive genetic testing.     Non-invasive Pregnancy Testing (NIPT) -   I reviewed current non-invasive screening options. Currently non-invasive pregnancy testing (NIPT) offers the highest detection rate (with the lowest false positive rate) for the detection of fetal aneuploidy amongst high-risk patients. The limitations of detailed mid-trimester sonography was reviewed with the patient. First trimester screening and second trimester multiple-marker serum serum screening as alternative aneuploidy screening options were also reviewed. However, both of these tests are associated with lower detection and higher false positive rates.    She declined aneuploidy screening and aneuploidy testing.  She would not act on the results.  She  also understands that the genetic risk is based on her age and I agreed to of her which was 32 years of age.    We discussed the recommended plan of care based on her  risk factors.  Libertad had her questions answered to her satisfaction.      IMPRESSION:  IUP at 20w0d  Normal level II ultrasound  IVF pregnancy  Advanced maternal age, low risk cell free DNA screen    RECOMMENDATIONS:  Continue care with Dr. Hsu  Fetal echocardiogram at 22-24 weeks  Follow-up growth & BPP ultrasound at 32 weeks.  Weekly NST's at 32 weeks.  Twice weekly testing at 38 weeks - weekly NST and weekly BPP.  Delivery at 39-40 weeks.      Total time spent 40 minutes this calendar day which includes preparing to see the patient including chart review, obtaining and/or reviewing additional medical history, performing a physical exam and evaluation, documenting clinical information in the electronic medical record, independently interpreting results, counseling the patient, communicating results to the patient/family/caregiver and coordinating care.     Case discussed with patient who demonstrated understanding and agreement with plan.     Thank you for allowing me to participate in the care of this patient.  Please feel free to contact me with any questions.    Jocelin Aguirre MD  Maternal-Fetal Medicine       Note to patient and family:  The 21st Century Cures Act makes medical notes available to patients in the interest of transparency.  However, please be advised that this is a medical document.  It is intended as a peer to peer communication.  It is written in medical language and may contain abbreviations or verbiage that are technical and unfamiliar.  It may appear blunt or direct.  Medical documents are intended to carry relevant information, facts as evident, and the clinical opinion of the practitioner.

## 2024-05-14 NOTE — PROGRESS NOTES
Outpatient Maternal-Fetal Medicine Follow-Up    Dear Dr. Hsu    Thank you for requesting ultrasound evaluation and maternal fetal medicine consultation on your patient Libertad Castro.  As you are aware she is a 38 year old female  with a gay pregnancy and an Estimated Date of Delivery: 24.  She returned to maternal-fetal medicine today for a follow-up visit.  Her history was reviewed from her prior visit and there were no interval changes.    Antepartum Risk Factors  IVF pregnancy  Advanced maternal age, low risk cell free DNA screen    PHYSICAL EXAMINATION:  /64 (BP Location: Right arm, Patient Position: Sitting, Cuff Size: adult)   Pulse 57   Ht 5' 7\" (1.702 m)   Wt 191 lb (86.6 kg)   BMI 29.91 kg/m²   General: alert and oriented, no acute distress  Abdomen: gravid, soft, non-tender  Extremities: non-tender, no edema    OBSTETRIC ULTRASOUND    FETAL ECHOCARDIOGRAM:    Single IUP in cephalic presentation.    Placenta is anterior.   A 3 vessel cord is noted.  Cardiac activity is present at 136 bpm  MVP is 6.7 cm .      A transabdominal 2D Doppler, fetal echocardiogram is performed. There is a four-chamber heart of appropriate cardiac dimensions. 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 Intracardiac connection appear to be normal.  The  atrioventricular valves appear normal. There is no evidence of pericardial or pleural effusion. The A-V conduction is one to one and the heart rate is appropriate for gestational age. No evidence of fetal arrhythmias is seen during today's study. There is a right to left shunting across the patent segundo ovale. There is a normal appearance of the aorta and branching pattern of the head vessels.    Normal fetal Doppler interrogations    There appears to be a structurally  normal fetal heart and rhythm. The patient was made aware of the limitations of the fetal heart study: malformations such as but not limiting to minor valve , VSD,  anomalous pulmonary venus return, or coarctation of the aorta maybe missed. I discussed the results with the patient.          See PACS/Imaging Tab For Complete Ultrasound Report  I interpreted the results and reviewed them with the patient.    DISCUSSION  During her visit we discussed and reviewed the following issues:  ADVANCED MATERNAL AGE  See prior MFM notes for a detailed review.  She did not desire invasive genetic testing.   She has already obtained a low-risk NPIT result and was appropriately reassured.     IVF GESTATION  See prior MFM notes for a detailed review.    IMPRESSION:  IUP at 24w4d  IVF pregnancy  Advanced maternal age, low risk cell free DNA screen     RECOMMENDATIONS:  Continue care with Dr. Hsu  Follow-up growth & BPP ultrasound at 32 weeks.  Weekly NST's at 32 weeks.  Twice weekly testing at 38 weeks - weekly NST and weekly BPP.  Delivery at 39-40 weeks.    Thank you for allowing me to participate in the care of your patient.  Please do not hesitate to contact me if additional questions or concerns arise.      Terell Howell M.D.    30 minutes spent in review of records, patient consultation, documentation and coordination of care.  The relevant clinical matter(s) are summarized above.     Note to patient and family  The 21st Century Cures Act makes medical notes available to patients in the interest of transparency.  However, please be advised that this is a medical document.  It is intended as [id number] communication.  It is written and medical language may contain abbreviations or verbiage that are technical and unfamiliar.  It may appear blunt or direct.  Medical documents are intended to carry relevant information, facts as evident, and the clinical opinion of the practitioner.

## 2024-05-28 ENCOUNTER — ULTRASOUND ENCOUNTER (OUTPATIENT)
Dept: PERINATAL CARE | Facility: HOSPITAL | Age: 39
End: 2024-05-28
Attending: OBSTETRICS & GYNECOLOGY

## 2024-05-28 VITALS
BODY MASS INDEX: 29.98 KG/M2 | WEIGHT: 191 LBS | DIASTOLIC BLOOD PRESSURE: 64 MMHG | SYSTOLIC BLOOD PRESSURE: 125 MMHG | HEIGHT: 67 IN | HEART RATE: 57 BPM

## 2024-05-28 DIAGNOSIS — O09.522 MULTIGRAVIDA OF ADVANCED MATERNAL AGE IN SECOND TRIMESTER (HCC): ICD-10-CM

## 2024-05-28 DIAGNOSIS — O09.529 AMA (ADVANCED MATERNAL AGE) MULTIGRAVIDA 35+ (HCC): ICD-10-CM

## 2024-05-28 DIAGNOSIS — O09.819 ENCOUNTER FOR SUPERVISION OF PREGNANCY RESULTING FROM ASSISTED REPRODUCTIVE TECHNOLOGY (HCC): Primary | ICD-10-CM

## 2024-05-28 DIAGNOSIS — O09.819 ENCOUNTER FOR SUPERVISION OF PREGNANCY RESULTING FROM ASSISTED REPRODUCTIVE TECHNOLOGY (HCC): ICD-10-CM

## 2024-05-28 DIAGNOSIS — O09.522 MULTIGRAVIDA OF ADVANCED MATERNAL AGE IN SECOND TRIMESTER (HCC): Primary | ICD-10-CM

## 2024-05-28 PROCEDURE — 76825 ECHO EXAM OF FETAL HEART: CPT | Performed by: OBSTETRICS & GYNECOLOGY

## 2024-05-28 PROCEDURE — 93325 DOPPLER ECHO COLOR FLOW MAPG: CPT

## 2024-05-28 PROCEDURE — 76827 ECHO EXAM OF FETAL HEART: CPT

## 2024-06-24 LAB
HIV RESULT OB: NEGATIVE
RAPID PLASMA REAGIN OB: NONREACTIVE

## 2024-08-01 ENCOUNTER — ULTRASOUND ENCOUNTER (OUTPATIENT)
Dept: PERINATAL CARE | Facility: HOSPITAL | Age: 39
End: 2024-08-01
Attending: STUDENT IN AN ORGANIZED HEALTH CARE EDUCATION/TRAINING PROGRAM
Payer: COMMERCIAL

## 2024-08-01 ENCOUNTER — HOSPITAL ENCOUNTER (INPATIENT)
Facility: HOSPITAL | Age: 39
LOS: 2 days | Discharge: HOME | End: 2024-08-03
Attending: STUDENT IN AN ORGANIZED HEALTH CARE EDUCATION/TRAINING PROGRAM | Admitting: STUDENT IN AN ORGANIZED HEALTH CARE EDUCATION/TRAINING PROGRAM
Payer: COMMERCIAL

## 2024-08-01 DIAGNOSIS — N93.9 VAGINAL BLEEDING: Primary | ICD-10-CM

## 2024-08-01 PROBLEM — Z34.90 PREGNANCY (HCC): Status: ACTIVE | Noted: 2024-08-01

## 2024-08-01 LAB
ANTIBODY SCREEN: NEGATIVE
BASOPHILS # BLD AUTO: 0.05 X10(3) UL (ref 0–0.2)
BASOPHILS NFR BLD AUTO: 0.4 %
EOSINOPHIL # BLD AUTO: 0.14 X10(3) UL (ref 0–0.7)
EOSINOPHIL NFR BLD AUTO: 1 %
ERYTHROCYTE [DISTWIDTH] IN BLOOD BY AUTOMATED COUNT: 12.2 %
HCT VFR BLD AUTO: 38.1 %
HGB BLD-MCNC: 13.3 G/DL
IMM GRANULOCYTES # BLD AUTO: 0.08 X10(3) UL (ref 0–1)
IMM GRANULOCYTES NFR BLD: 0.6 %
LYMPHOCYTES # BLD AUTO: 3.93 X10(3) UL (ref 1–4)
LYMPHOCYTES NFR BLD AUTO: 28.8 %
MCH RBC QN AUTO: 32.3 PG (ref 26–34)
MCHC RBC AUTO-ENTMCNC: 34.9 G/DL (ref 31–37)
MCV RBC AUTO: 92.5 FL
MONOCYTES # BLD AUTO: 1.06 X10(3) UL (ref 0.1–1)
MONOCYTES NFR BLD AUTO: 7.8 %
NEUTROPHILS # BLD AUTO: 8.37 X10 (3) UL (ref 1.5–7.7)
NEUTROPHILS # BLD AUTO: 8.37 X10(3) UL (ref 1.5–7.7)
NEUTROPHILS NFR BLD AUTO: 61.4 %
PLATELET # BLD AUTO: 237 10(3)UL (ref 150–450)
RBC # BLD AUTO: 4.12 X10(6)UL
RH BLOOD TYPE: POSITIVE
T PALLIDUM AB SER QL IA: NONREACTIVE
WBC # BLD AUTO: 13.6 X10(3) UL (ref 4–11)

## 2024-08-01 PROCEDURE — 76816 OB US FOLLOW-UP PER FETUS: CPT | Performed by: STUDENT IN AN ORGANIZED HEALTH CARE EDUCATION/TRAINING PROGRAM

## 2024-08-01 PROCEDURE — 86850 RBC ANTIBODY SCREEN: CPT | Performed by: STUDENT IN AN ORGANIZED HEALTH CARE EDUCATION/TRAINING PROGRAM

## 2024-08-01 PROCEDURE — 86780 TREPONEMA PALLIDUM: CPT | Performed by: STUDENT IN AN ORGANIZED HEALTH CARE EDUCATION/TRAINING PROGRAM

## 2024-08-01 PROCEDURE — 99214 OFFICE O/P EST MOD 30 MIN: CPT

## 2024-08-01 PROCEDURE — 86900 BLOOD TYPING SEROLOGIC ABO: CPT | Performed by: STUDENT IN AN ORGANIZED HEALTH CARE EDUCATION/TRAINING PROGRAM

## 2024-08-01 PROCEDURE — 86901 BLOOD TYPING SEROLOGIC RH(D): CPT | Performed by: STUDENT IN AN ORGANIZED HEALTH CARE EDUCATION/TRAINING PROGRAM

## 2024-08-01 PROCEDURE — 76819 FETAL BIOPHYS PROFIL W/O NST: CPT

## 2024-08-01 PROCEDURE — 85025 COMPLETE CBC W/AUTO DIFF WBC: CPT | Performed by: STUDENT IN AN ORGANIZED HEALTH CARE EDUCATION/TRAINING PROGRAM

## 2024-08-01 RX ORDER — CHOLECALCIFEROL (VITAMIN D3) 25 MCG
1 TABLET,CHEWABLE ORAL DAILY
Status: DISCONTINUED | OUTPATIENT
Start: 2024-08-01 | End: 2024-08-01

## 2024-08-01 RX ORDER — ACETAMINOPHEN 500 MG
1000 TABLET ORAL EVERY 6 HOURS PRN
Status: DISCONTINUED | OUTPATIENT
Start: 2024-08-01 | End: 2024-08-03

## 2024-08-01 RX ORDER — CHOLECALCIFEROL (VITAMIN D3) 25 MCG
1 TABLET,CHEWABLE ORAL DAILY
Status: DISCONTINUED | OUTPATIENT
Start: 2024-08-01 | End: 2024-08-03

## 2024-08-01 RX ORDER — BETAMETHASONE SODIUM PHOSPHATE AND BETAMETHASONE ACETATE 3; 3 MG/ML; MG/ML
12 INJECTION, SUSPENSION INTRA-ARTICULAR; INTRALESIONAL; INTRAMUSCULAR; SOFT TISSUE EVERY 24 HOURS
Status: COMPLETED | OUTPATIENT
Start: 2024-08-01 | End: 2024-08-02

## 2024-08-01 RX ORDER — SODIUM CHLORIDE, SODIUM LACTATE, POTASSIUM CHLORIDE, CALCIUM CHLORIDE 600; 310; 30; 20 MG/100ML; MG/100ML; MG/100ML; MG/100ML
INJECTION, SOLUTION INTRAVENOUS CONTINUOUS
Status: DISCONTINUED | OUTPATIENT
Start: 2024-08-01 | End: 2024-08-03

## 2024-08-01 RX ORDER — CALCIUM CARBONATE 500 MG/1
1000 TABLET, CHEWABLE ORAL
Status: DISCONTINUED | OUTPATIENT
Start: 2024-08-01 | End: 2024-08-03

## 2024-08-01 RX ORDER — DOCUSATE SODIUM 100 MG/1
100 CAPSULE, LIQUID FILLED ORAL 2 TIMES DAILY
Status: DISCONTINUED | OUTPATIENT
Start: 2024-08-01 | End: 2024-08-03

## 2024-08-01 RX ORDER — ACETAMINOPHEN 500 MG
500 TABLET ORAL EVERY 6 HOURS PRN
Status: DISCONTINUED | OUTPATIENT
Start: 2024-08-01 | End: 2024-08-03

## 2024-08-01 RX ORDER — ZOLPIDEM TARTRATE 5 MG/1
5 TABLET ORAL NIGHTLY PRN
Status: DISCONTINUED | OUTPATIENT
Start: 2024-08-01 | End: 2024-08-03

## 2024-08-01 NOTE — CONSULTS
[facility]   part of [facility]    Maternal-Fetal Medicine Inpatient Consultation    Date of Admission:  2024  Date of Consult:  2024    Reason for Consult:   Vaginal bleeding    History of Present Illness:  Libertad Castro is a a(n) 38 year old female  with an IUP at 33w6d and an JAMES of Estimated Date of Delivery: 24 who  presents with vaginal bleeding.  Patient had a prenatal visit with her provider.  She reported significant vaginal bleeding while in the office.  Speculum examination performed by the provider in the office showed a large clot in the vagina.  A sterile vaginal exam reported that this patient's cervix was closed.  The patient was subsequently sent to labor and delivery for further evaluation.    Review of History:      OB History:    OB History    Para Term  AB Living   5 2 2   2 2   SAB IAB Ectopic Multiple Live Births   2     0 2      # Outcome Date GA Lbr Darren/2nd Weight Sex Type Anes PTL Lv   5 Current            4 SAB 23 6w0d    SAB      3 Term 21 39w5d 00:49 / 00:29 7 lb 10.4 oz (3.47 kg) F NORMAL SPONT EPI N ERIC      Complications: Late decelerations   2 Term 07/15/19 40w2d 07:40 / 00:46 7 lb 1.6 oz (3.22 kg) M NORMAL SPONT EPI N ERIC      Complications: Variable decelerations   1 SAB 2018     SAB          Other Relevant History:  Past Medical History:    Abdominal adhesions    Asthma (HCC)    Back problem    3 bulging discs, exercises to prevent problems    Bowel obstruction (HCC)    Malrotation of intestine (HCC)    situs inversus    Premature baby (HCC)    Premature birth (HCC)     Past Surgical History:   Procedure Laterality Date    Appendectomy      Breast lumpectomy      Follicle [facility],retrieval of oocyte  2018    Hysteroscopy  2023    placental site nodule resected    Other      abdominal adhesions , when prematurley born had malrotation of bowels, adhesion correction x 2 in past, fibroadenoma removed  from both breasts in past    Other surgical history  05/2017    exploratory laparotomy; lysis of adhesions     Family History   Problem Relation Age of Onset    Asthma Father     Asthma Brother     Cancer Maternal Grandmother         Hodgkin's Lymphoma    Hypertension Maternal Grandfather     Ovarian Cancer Paternal Grandmother     Heart Disease Paternal Grandfather     Other (Parkinson's Disease) Paternal Grandfather     Breast Cancer Maternal Aunt       reports that she has never smoked. She has never used smokeless tobacco. She reports that she does not currently use alcohol. She reports that she does not use drugs.    Allergies:  Allergies   Allergen Reactions    Bananas     Melons     Seasonal        Medications:    Current Facility-Administered Medications:     lactated ringers infusion, , Intravenous, Continuous    acetaminophen (Tylenol Extra Strength) tab 500 mg, 500 mg, Oral, Q6H PRN **OR** acetaminophen (Tylenol Extra Strength) tab 1,000 mg, 1,000 mg, Oral, Q6H PRN    zolpidem (Ambien) tab 5 mg, 5 mg, Oral, Nightly PRN    docusate sodium (Colace) cap 100 mg, 100 mg, Oral, BID    calcium carbonate (Tums) chewable tab 1,000 mg, 1,000 mg, Oral, Daily PRN    prenatal vitamin with DHA (PNV with DHA) cap 1 capsule, 1 capsule, Oral, Daily    Physical Exam:  Temp:  [98.1 °F (36.7 °C)] 98.1 °F (36.7 °C)  Pulse:  [58] 58  Resp:  [16] 16  BP: (131)/(79) 131/79    Gen: No acute distress, alert and oriented x3  Abd: Gravid abdomen, soft, nontender, non-distended  Cervix: deffered  Ext: NO EDEMA    Laboratory Data:  Lab Results   Component Value Date    WBC 13.6 08/01/2024    HGB 13.3 08/01/2024    HCT 38.1 08/01/2024    .0 08/01/2024     FETAL STATUS:  FHRT: 135 baseline, moderate variability, Reactive, decelerations: none  Uterine Contractions: regular, every 4-6 minutes    Ultrasound:  Ultrasound Findings:  Single IUP in breech presentation.    Placenta is anterior.   Cardiac activity is present at 148  bp  EFW 2559 g ( 5 lb 10 oz); 63%.    ELDER is  11.9 cm.  MVP is 7.5 cm  BPP is 8/8.     The fetal measurements are consistent with established EDC. No gross ultrasound evidence of structural abnormalities are seen today. The patient understands that ultrasound cannot rule out all structural and chromosomal abnormalities.     NARRATIVE:   THREATENED PLACENTAL ABRUPTION  I discussed the concerns with placental abruption in pregnancy.  I explained the meaning of abruption.  I discussed the increased risk for recurrent bleeding and  contractions or in some cases the development of  labor.  In some cases the abruption can lead to  delivery.  I discussed the risk for fetal growth restriction in cases of early or chronic placental abruption.  Based on her gestational age, and the increased risk for recurrent abruption within 5-7 days of bleeding, I recommended that the patient be observed in L&D until she has had 24-48 hours without active  bleeding. If the patient remains stable with no further bleeding or suspected recurrent abruption, it is reasonable to resume outpatient management.  If the bleeding recurs, she should remain hospitalized until there is no further bleeding for at least 1 week.      In light of her current bleeding advised a course of corticosteroids.  Given her relatively advanced albeit late  gestational age I would not advise tocolysis in the setting.    IMPRESSION:   IUP at 33w6d  Third Trimester Vaginal Bleeding  IVF pregnancy  Advanced maternal age, low risk cell free DNA screen    RECOMMENDATIONS:   Administer course of betamethasone  Monitor for further bleeding  Delivery advised for nonreassuring fetal heart rate tracing or persistent significant maternal vaginal bleeding  If patient remains without bleeding for 24 to 48 hours outpatient management may be considered    Terell Howell M.D.  2024  1:24 PM    Thank you for allowing me to participate in the care of  your patient.  Please do not hesitate to contact me if additional questions or concerns arise.      Total Time spent with patient and coordinating care:  60 minutes

## 2024-08-01 NOTE — PROGRESS NOTES
Pt is a 38 year old female admitted to 118/118-A.     Chief Complaint   Patient presents with    Vaginal Bleeding      Pt is  33w6d intra-uterine pregnancy.  History obtained, consents signed. Oriented to room, staff, and plan of care.

## 2024-08-01 NOTE — IMAGING NOTE
Ultrasound Findings:  Single IUP in breech presentation.    Placenta is anterior.   Cardiac activity is present at 148 bp  EFW 2559 g ( 5 lb 10 oz); 63%.    ELDER is  11.9 cm.  MVP is 7.5 cm  BPP is 8/8.     The fetal measurements are consistent with established EDC. No gross ultrasound evidence of structural abnormalities are seen today. The patient understands that ultrasound cannot rule out all structural and chromosomal abnormalities.

## 2024-08-01 NOTE — H&P
[facility]  Obstetrics Admission History & Physical    [facility]:  [facility]    1985 MRN [id number]   Location [facility] LABOR & DELIVERY Attending Mary Hsu MD   Hosp Day # 0 PCP None Pcp       HISTORY OF PRESENT ILLNESS:  The patient is a 38 year old  female at 33w6d Estimated Date of Delivery: 24, admitted for observation due to vaginal bleeding. She presented to the office for her scheduled 33 week prenatal visit with NST/ELDER. She reported significant vaginal bleeding while in the office bathroom prior to her visit.  On transabdominal US the baby was breech with ELDER 11.4cm and cervical length 3.22cm. A speculum exam in the office showed a large clot in the vagina. The cervix was gently examined digitally due to suboptimal visualization and noted to be closed. She had a cervical polyp visualized at her initial visit in the pregnancy. A polyp was not palpated in the office today, but visualization on speculum exam was poor due to the blood. She was sent directly to L&D.     This is an IVF pregnancy with frozen embryo transfer. She has been taking prophylactic ASA for AMA and IVF. She has a history of asthma exacerbated with exercise and in the fall. She had a placental site nodule resected hysteroscopically in 2023. 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 She has a history of postpartum anxiety after her second child.     PAST MEDICAL HISTORY:   Past Medical History:    Abdominal adhesions    Asthma (HCC)    Back problem    3 bulging discs, exercises to prevent problems    Bowel obstruction (HCC)    Malrotation of intestine (HCC)    situs inversus    Premature baby (HCC)    Premature birth (HCC)        PAST SURGICAL HISTORY:     Past Surgical History:   Procedure Laterality Date    Appendectomy      Breast lumpectomy      Follicle punc,retrieval of  oocyte  2018    Hysteroscopy  2023    placental site nodule resected    Other      abdominal adhesions , when prematurley born had malrotation of bowels, adhesion correction x 2 in past, fibroadenoma removed from both breasts in past    Other surgical history  2017    exploratory laparotomy; lysis of adhesions        MEDICATIONS:     Medications Prior to Admission   Medication Sig Dispense Refill Last Dose    aspirin 81 MG Oral Tab EC Take 2 tablets (162 mg total) by mouth daily.   2024    cholecalciferol (VITAMIN D3) 125 MCG (5000 UT) Oral Cap Take 1 capsule (5,000 Units total) by mouth daily.   2024    prenatal multivitamin plus DHA 27-0.8-228 MG Oral Cap Take 1 capsule by mouth daily.   2024    Levocetirizine Dihydrochloride (XYZAL OR) Take 1 tablet by mouth daily.     2024    ALBUTEROL SULFATE PRN   Past Month    ibuprofen 600 MG Oral Tab Take 1 tablet (600 mg total) by mouth every 6 (six) hours as needed for Fever. 20 tablet 0     SYMBICORT IN 2 puffs once daily (Patient not taking: Reported on 2024)   More than a month        ALLERGIES:     Allergies   Allergen Reactions    Bananas     Melons     Seasonal         SOCIAL HISTORY:     Social History     Tobacco Use    Smoking status: Never    Smokeless tobacco: Never   Substance Use Topics    Alcohol use: Not Currently       FAMILY HISTORY:   Family History   Problem Relation Age of Onset    Asthma Father     Asthma Brother     Cancer Maternal Grandmother         Hodgkin's Lymphoma    Hypertension Maternal Grandfather     Ovarian Cancer Paternal Grandmother     Heart Disease Paternal Grandfather     Other (Parkinson's Disease) Paternal Grandfather     Breast Cancer Maternal Aunt         GYNECOLOGICAL HISTORY:        Menarche 12yo. Menses every 25 days, lasting 4 days.  No history of an abnormal Pap smear.  No history of any sexually transmitted diseases.       OBSTETRIC HISTORY:    OB History    Para Term   AB Living   5 2 2   2 2   SAB IAB Ectopic Multiple Live Births   2     0 2      # Outcome Date GA Lbr Darren/2nd Weight Sex Type Anes PTL Lv   5 Current            4 SAB 23 6w0d    SAB      3 Term 21 39w5d 00:49 / 00:29 7 lb 10.4 oz (3.47 kg) F NORMAL SPONT EPI N ERIC      Complications: Late decelerations   2 Term 07/15/19 40w2d 07:40 / 00:46 7 lb 1.6 oz (3.22 kg) M NORMAL SPONT EPI N ERIC      Complications: Variable decelerations   1 SAB 2018     SAB           O positive, antibody screen negative  rubella immune  HBsAg: nonreactive  RPR nonreactive  Urine culture negative  HIV negative  Hemoglobin electrophoresis normal adult pattern  GC/chlamydia negative  Parvo nonimmune  Hepatitis C Ab negative  Genetic screening: declined preimplantation testing, NIPT, and diagnostic testing   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    PELVIC:  cervix closed in the office, poor visualization due to blood     ASSESSMENT:    1. Intrauterine pregnancy, 33w6d.  2. Vaginal bleeding  3. Breech fetal presentation  4. IVF pregnancy  5. AMA  6. History of placental site nodule  7. History of postpartum anxiety     PLAN:  The patient is admitted for observation due to sudden onset heavy vaginal bleeding today.  Continuous monitoring.  MFM consult.     Mary Hsu MD  2024

## 2024-08-02 LAB
BASOPHILS # BLD AUTO: 0.03 X10(3) UL (ref 0–0.2)
BASOPHILS NFR BLD AUTO: 0.2 %
EOSINOPHIL # BLD AUTO: 0.01 X10(3) UL (ref 0–0.7)
EOSINOPHIL NFR BLD AUTO: 0.1 %
ERYTHROCYTE [DISTWIDTH] IN BLOOD BY AUTOMATED COUNT: 12.2 %
FIBRINOGEN PPP-MCNC: 418 MG/DL (ref 200–480)
HCT VFR BLD AUTO: 33.5 %
HGB BLD-MCNC: 11.3 G/DL
IMM GRANULOCYTES # BLD AUTO: 0.12 X10(3) UL (ref 0–1)
IMM GRANULOCYTES NFR BLD: 0.8 %
LYMPHOCYTES # BLD AUTO: 3.2 X10(3) UL (ref 1–4)
LYMPHOCYTES NFR BLD AUTO: 21.5 %
MCH RBC QN AUTO: 32 PG (ref 26–34)
MCHC RBC AUTO-ENTMCNC: 33.7 G/DL (ref 31–37)
MCV RBC AUTO: 94.9 FL
MONOCYTES # BLD AUTO: 0.85 X10(3) UL (ref 0.1–1)
MONOCYTES NFR BLD AUTO: 5.7 %
NEUTROPHILS # BLD AUTO: 10.68 X10 (3) UL (ref 1.5–7.7)
NEUTROPHILS # BLD AUTO: 10.68 X10(3) UL (ref 1.5–7.7)
NEUTROPHILS NFR BLD AUTO: 71.7 %
PLATELET # BLD AUTO: 237 10(3)UL (ref 150–450)
RBC # BLD AUTO: 3.53 X10(6)UL
WBC # BLD AUTO: 14.9 X10(3) UL (ref 4–11)

## 2024-08-02 PROCEDURE — 85025 COMPLETE CBC W/AUTO DIFF WBC: CPT | Performed by: STUDENT IN AN ORGANIZED HEALTH CARE EDUCATION/TRAINING PROGRAM

## 2024-08-02 PROCEDURE — 85384 FIBRINOGEN ACTIVITY: CPT | Performed by: STUDENT IN AN ORGANIZED HEALTH CARE EDUCATION/TRAINING PROGRAM

## 2024-08-02 NOTE — PROGRESS NOTES
RN advised by patient that she passed a small clot a this time. Pt already flushed the toilet: RN unable to assess the size of the clot.

## 2024-08-02 NOTE — PAYOR COMM NOTE
--------------  ADMISSION REVIEW     Payor: MIRIAN DIONNE  Subscriber #:  [id number]  Authorization Number: [id number]    Admit date: 8/1/24  Admit time:  5:45 PM       REVIEW DOCUMENTATION:  ED Provider Notes    No notes of this type exist for this encounter.         MEDICATIONS ADMINISTERED IN LAST 1 DAY:  betamethasone sodium phosphate & acetate (Celestone) 6 (3-3) MG/ML injection 12 mg       Date Action Dose Route User    8/1/2024 1522 Given 12 mg Intramuscular (Left Upper Outer Gluteus) Drea Vital RN          calcium carbonate (Tums) chewable tab 1,000 mg       Date Action Dose Route User    8/2/2024 0006 Given 1,000 mg Oral Margi Junior RN          docusate sodium (Colace) cap 100 mg       Date Action Dose Route User    8/1/2024 2212 Given 100 mg Oral Margi Junior RN          lactated ringers infusion       Date Action Dose Route User    8/2/2024 0956 New Bag (none) Intravenous Jazmyn Coleman RN    8/2/2024 0232 New Bag (none) Intravenous Margi Junior RN    8/1/2024 2052 New Bag (none) Intravenous Rosemarie Alex RN          prenatal vitamin with DHA (PNV with DHA) cap 1 capsule       Date Action Dose Route User    8/2/2024 0837 Given 1 capsule Oral Jazmyn Coleman RN    8/1/2024 2212 Given 1 capsule Oral Margi Junior RN            Vitals (last day)       Date/Time Temp Pulse Resp BP SpO2 Weight O2 Device O2 Flow Rate (L/min) [employer]    08/02/24 1030 98.4 °F (36.9 °C) 85 -- 119/65 -- -- -- --     08/02/24 0855 98.2 °F (36.8 °C) -- -- -- -- -- -- --     08/02/24 0615 -- 61 -- 112/48 -- -- -- --     08/02/24 0005 97.1 °F (36.2 °C) 80 -- 128/66 95 % -- None (Room air) --     08/01/24 2245 -- -- -- -- 95 % -- None (Room air) --     08/01/24 2002 98.2 °F (36.8 °C) 60 18 122/69 -- -- None (Room air) --     08/01/24 1530 98.3 °F (36.8 °C) 58 16 127/66 -- -- -- --     08/01/24 1426 -- 83 -- -- 100 % -- -- --     08/01/24 1400 -- 75 -- -- 98 % -- -- --     08/01/24 1345 -- 64 -- --  99 % -- -- -- PG    24 1330 -- 72 -- -- 99 % -- -- -- PG    24 1315 -- 73 -- -- 97 % -- -- -- PG    24 1130 98.1 °F (36.7 °C) 58 16 131/79 -- 205 lb (93 kg) -- -- PG    24 1121 -- -- -- -- -- 205 lb (93 kg) -- -- PG     8/ H&P       HISTORY OF PRESENT ILLNESS:  The patient is a 38 year old  female at 33w6d Estimated Date of Delivery: 24, admitted for observation due to vaginal bleeding. She presented to the office for her scheduled 33 week prenatal visit with NST/ELDER. She reported significant vaginal bleeding while in the office bathroom prior to her visit.  On transabdominal US the baby was breech with ELDER 11.4cm and cervical length 3.22cm. A speculum exam in the office showed a large clot in the vagina. The cervix was gently examined digitally due to suboptimal visualization and noted to be closed. She had a cervical polyp visualized at her initial visit in the pregnancy. A polyp was not palpated in the office today, but visualization on speculum exam was poor due to the blood. She was sent directly to L&D.      This is an IVF pregnancy with frozen embryo transfer. She has been taking prophylactic ASA for AMA and IVF. She has a history of asthma exacerbated with exercise and in the fall. She had a placental site nodule resected hysteroscopically in 2023. 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 She has a history of postpartum anxiety after her second child.          GYNECOLOGICAL HISTORY:        Menarche 10yo. Menses every 25 days, lasting 4 days.  No history of an abnormal Pap smear.  No history of any sexually transmitted diseases.       OBSTETRIC HISTORY:                     OB History    Para Term  AB Living   5 2 2   2 2   SAB IAB Ectopic Multiple Live Births      2     0 2          # Outcome Date GA Lbr Darren/2nd Weight Sex Type Anes PTL Lv   5 Current                      4 SAB 23 6w0d       SAB         3 Term 21 39w5d 00:49 / 00:29 7 lb 10.4 oz (3.47 kg) F NORMAL SPONT EPI N ERIC      Complications: Late decelerations   2 Term 07/15/19 40w2d 07:40 / 00:46 7 lb 1.6 oz (3.22 kg) M NORMAL SPONT EPI N ERIC      Complications: Variable decelerations   1 SAB 2018         SAB               O positive, antibody screen negative  rubella immune  HBsAg: nonreactive  RPR nonreactive  Urine culture negative  HIV negative  Hemoglobin electrophoresis normal adult pattern  GC/chlamydia negative  Parvo nonimmune  Hepatitis C Ab negative  Genetic screening: declined preimplantation testing, NIPT, and diagnostic testing   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    PELVIC:  cervix closed in the office, poor visualization due to blood     ASSESSMENT:    1. Intrauterine pregnancy, 33w6d.  2. Vaginal bleeding  3. Breech fetal presentation  4. IVF pregnancy  5. AMA  6. History of placental site nodule  7. History of postpartum anxiety     PLAN:  The patient is admitted  due to sudden onset heavy vaginal bleeding today.  Continuous monitoring.  MFM consult.      MFM CONSULT NOTE  Date of Consult:  2024     Reason for Consult:   Vaginal bleeding     History of Present Illness:  Libertad Castro is a a(n) 38 year old female  with an IUP at 33w6d and an JAMES of Estimated Date of Delivery: 24 who  presents with vaginal bleeding.  Patient had a prenatal visit with her provider.  She reported  significant vaginal bleeding while in the office.  Speculum examination performed by the provider in the office showed a large clot in the vagina.  A sterile vaginal exam reported that this patient's cervix was closed.  The patient was subsequently sent to labor and delivery for further evaluation.      Laboratory Data:        Lab Results   Component Value Date     WBC 13.6 2024     HGB 13.3 2024     HCT 38.1 2024     .0 2024      FETAL STATUS:  FHRT: 135 baseline, moderate variability, Reactive, decelerations: none  Uterine Contractions: regular, every 4-6 minutes     Ultrasound:  Ultrasound Findings:  Single IUP in breech presentation.    Placenta is anterior.   Cardiac activity is present at 148 bp  EFW 2559 g ( 5 lb 10 oz); 63%.    ELDER is  11.9 cm.  MVP is 7.5 cm  BPP is 8/8.      The fetal measurements are consistent with established EDC. No gross ultrasound evidence of structural abnormalities are seen today. The patient understands that ultrasound cannot rule out all structural and chromosomal abnormalities.      NARRATIVE:   THREATENED PLACENTAL ABRUPTION  I discussed the concerns with placental abruption in pregnancy.  I explained the meaning of abruption.  I discussed the increased risk for recurrent bleeding and  contractions or in some cases the development of  labor.  In some cases the abruption can lead to  delivery.  I discussed the risk for fetal growth restriction in cases of early or chronic placental abruption.  Based on her gestational age, and the increased risk for recurrent abruption within 5-7 days of bleeding, I recommended that the patient be observed in L&D until she has had 24-48 hours without active  bleeding. If the patient remains stable with no further bleeding or suspected recurrent abruption, it is reasonable to resume outpatient management.  If the bleeding recurs, she should remain hospitalized until there is no further bleeding  for at least 1 week.       In light of her current bleeding advised a course of corticosteroids.  Given her relatively advanced albeit late  gestational age I would not advise tocolysis in the setting.     IMPRESSION:   IUP at 33w6d  Third Trimester Vaginal Bleeding  IVF pregnancy  Advanced maternal age, low risk cell free DNA screen     RECOMMENDATIONS:   Administer course of betamethasone  Monitor for further bleeding  Delivery advised for nonreassuring fetal heart rate tracing or persistent significant maternal vaginal bleeding  If patient remains without bleeding for 24 to 48 hours outpatient management may be considered      OB/GYN NOTE  S: doing well, no contractions with regularity, she notes no active bleeding only scant brownish discharge, fetal movement is good,   O: /65   Pulse 85   Temp 98.4 °F (36.9 °C) (Oral)   Resp 18   Ht 5' 7\" (1.702 m)   Wt 205 lb (93 kg)   SpO2 95%   BMI 32.11 kg/m²      Fetal status is reassuring - moderate variability with accels , periodic 3-4 min deceleration to the 110s from a baseline of 130s contractions periodically     General: alert and oriented x3  Respiratory: non labored breathing  Abdomen: soft nt  Extremities: minimal edema, no evidence of a dvt        A IUP at 34 weeks     Vaginal bleeding now only scant brown discharge     Breech fetus      IVF pregnancy    AMA     History of placental site nodule     History of postpartum anxiety  P: continue careful observation and she will receive her second dose of steroids today and I will touch base with MFM about a plan for home care and work

## 2024-08-02 NOTE — PLAN OF CARE
Problem: Patient/Family Goals  Goal: Patient/Family Long Term Goal  Description: Patient's Long Term Goal: remain pregnant    Interventions:    - See additional Care Plan goals for specific interventions  Outcome: Progressing  Goal: Patient/Family Short Term Goal  Description: Patient's Short Term Goal: manage bleeding    Interventions:     - See additional Care Plan goals for specific interventions  Outcome: Progressing     Problem: ANTEPARTUM/LABOR and DELIVERY  Goal: Maintain pregnancy as long as maternal and/or fetal condition is stable  Description: INTERVENTIONS:  - Maternal surveillance  - Fetal surveillance  - Monitor uterine activity  - Medications as ordered  - Bedrest  Outcome: Progressing  Goal: Anxiety is at manageable level  Description: INTERVENTIONS:  - [place] patient to unit/surroundings  - Establish a trusting relationship with patient  - Discuss possible complications or alterations in birth plan  - Explain treatment plan  - Explain testing/procedures prior to initiation  - Encourage participation in care  - Encourage verbalization of concerns/fears  - Identify coping mechanisms  - Administer/offer alternative therapies (Aroma therapy, distraction, guided imagery, massage, music therapy, therapeutic touch)  - Manage patient's environment (Avoid overstimulation of patient)  Outcome: Progressing  Goal: Demonstrates ability to cope with hospitalization/illness  Description: INTERVENTIONS:  - Encourage verbalization of feelings/concerns/expectations  - Provide quiet environment  - Assist patient to identify own strengths and abilities  - Encourage patient to set small goals for self  - Encourage participation in diversional activity  - Reinforce positive adaptation of new coping behaviors  - Include patient/family/caregiver in decisions  Outcome: Progressing

## 2024-08-02 NOTE — PLAN OF CARE
Problem: Patient/Family Goals  Goal: Patient/Family Long Term Goal  Description: Patient's Long Term Goal: remain pregnant    Interventions:    - See additional Care Plan goals for specific interventions  Outcome: Progressing  Goal: Patient/Family Short Term Goal  Description: Patient's Short Term Goal: manage bleeding    Interventions:     - See additional Care Plan goals for specific interventions  Outcome: Progressing     Problem: ANTEPARTUM/LABOR and DELIVERY  Goal: Maintain pregnancy as long as maternal and/or fetal condition is stable  Description: INTERVENTIONS:  - Maternal surveillance  - Fetal surveillance  - Monitor uterine activity  - Medications as ordered  - Bedrest  Outcome: Progressing  Goal: Anxiety is at manageable level  Description: INTERVENTIONS:  - [place] patient to unit/surroundings  - Establish a trusting relationship with patient  - Discuss possible complications or alterations in birth plan  - Explain treatment plan  - Explain testing/procedures prior to initiation  - Encourage participation in care  - Encourage verbalization of concerns/fears  - Identify coping mechanisms  - Administer/offer alternative therapies (Aroma therapy, distraction, guided imagery, massage, music therapy, therapeutic touch)  - Manage patient's environment (Avoid overstimulation of patient)  Outcome: Progressing  Goal: Demonstrates ability to cope with hospitalization/illness  Description: INTERVENTIONS:  - Encourage verbalization of feelings/concerns/expectations  - Provide quiet environment  - Assist patient to identify own strengths and abilities  - Encourage patient to set small goals for self  - Encourage participation in diversional activity  - Reinforce positive adaptation of new coping behaviors  - Include patient/family/caregiver in decisions  Outcome: Progressing

## 2024-08-02 NOTE — PROGRESS NOTES
S: doing well, no contractions with regularity, she notes no active bleeding only scant brownish discharge, fetal movement is good,   O: /65   Pulse 85   Temp 98.4 °F (36.9 °C) (Oral)   Resp 18   Ht 5' 7\" (1.702 m)   Wt 205 lb (93 kg)   SpO2 95%   BMI 32.11 kg/m²     Fetal status is reassuring - moderate variability with accels , periodic 3-4 min deceleration to the 110s from a baseline of 130s contractions periodically    General: alert and oriented x3  Respiratory: non labored breathing  Abdomen: soft nt  Extremities: minimal edema, no evidence of a dvt      A IUP at 34 weeks     Vaginal bleeding now only scant brown discharge     Breech fetus      IVF pregnancy    AMA     History of placental site nodule     History of postpartum anxiety  P: continue careful observation and she will receive her second dose of steroids today and I will touch base with MFM about a plan for home care and work

## 2024-08-03 VITALS
HEIGHT: 67 IN | TEMPERATURE: 98 F | OXYGEN SATURATION: 95 % | BODY MASS INDEX: 32.18 KG/M2 | SYSTOLIC BLOOD PRESSURE: 123 MMHG | DIASTOLIC BLOOD PRESSURE: 81 MMHG | RESPIRATION RATE: 18 BRPM | WEIGHT: 205 LBS | HEART RATE: 58 BPM

## 2024-08-03 NOTE — PLAN OF CARE
Problem: Patient/Family Goals  Goal: Patient/Family Long Term Goal  Description: Patient's Long Term Goal: remain pregnant    Interventions:    - See additional Care Plan goals for specific interventions  8/3/2024 1112 by Alexus Florence RN  Outcome: Adequate for Discharge  8/3/2024 0923 by Alexus Florence RN  Outcome: Progressing  Goal: Patient/Family Short Term Goal  Description: Patient's Short Term Goal: manage bleeding    Interventions:     - See additional Care Plan goals for specific interventions  8/3/2024 1112 by Alexus Florence RN  Outcome: Adequate for Discharge  8/3/2024 0923 by Alexus Florence RN  Outcome: Progressing     Problem: ANTEPARTUM/LABOR and DELIVERY  Goal: Maintain pregnancy as long as maternal and/or fetal condition is stable  Description: INTERVENTIONS:  - Maternal surveillance  - Fetal surveillance  - Monitor uterine activity  - Medications as ordered  - Bedrest  8/3/2024 1112 by Alexus Florence RN  Outcome: Adequate for Discharge  8/3/2024 0923 by Alexus Florence RN  Outcome: Progressing  Goal: Anxiety is at manageable level  Description: INTERVENTIONS:  - [place] patient to unit/surroundings  - Establish a trusting relationship with patient  - Discuss possible complications or alterations in birth plan  - Explain treatment plan  - Explain testing/procedures prior to initiation  - Encourage participation in care  - Encourage verbalization of concerns/fears  - Identify coping mechanisms  - Administer/offer alternative therapies (Aroma therapy, distraction, guided imagery, massage, music therapy, therapeutic touch)  - Manage patient's environment (Avoid overstimulation of patient)  8/3/2024 1112 by Alexus Florence RN  Outcome: Adequate for Discharge  8/3/2024 0923 by Alexus Florence RN  Outcome: Progressing  Goal: Demonstrates ability to cope with hospitalization/illness  Description: INTERVENTIONS:  - Encourage verbalization of feelings/concerns/expectations  - Provide quiet  environment  - Assist patient to identify own strengths and abilities  - Encourage patient to set small goals for self  - Encourage participation in diversional activity  - Reinforce positive adaptation of new coping behaviors  - Include patient/family/caregiver in decisions  8/3/2024 1112 by Alexus Florence, RN  Outcome: Adequate for Discharge  8/3/2024 0923 by Alexus Florence, RN  Outcome: Progressing

## 2024-08-03 NOTE — DISCHARGE INSTRUCTIONS
Discharge Instructions    Diet: regular     Restrictions: Minimize stair-climbing;No sexual activity;No lifting;Nothing in vagina - [place], tampons, douche;No exercising      General Instructions    Call your OB doctor if: Vaginal or rectal pressure;Temperature greater than 100F;Vaginal bleeding;Decrease in fetal movement;Fluid leaking from your vagina;Uterine contractions increasing in intensity and frequency

## 2024-08-03 NOTE — PLAN OF CARE
Problem: Patient/Family Goals  Goal: Patient/Family Long Term Goal  Description: Patient's Long Term Goal: remain pregnant    Interventions:    - See additional Care Plan goals for specific interventions  Outcome: Progressing  Goal: Patient/Family Short Term Goal  Description: Patient's Short Term Goal: manage bleeding    Interventions:     - See additional Care Plan goals for specific interventions  Outcome: Progressing     Problem: ANTEPARTUM/LABOR and DELIVERY  Goal: Maintain pregnancy as long as maternal and/or fetal condition is stable  Description: INTERVENTIONS:  - Maternal surveillance  - Fetal surveillance  - Monitor uterine activity  - Medications as ordered  - Bedrest  Outcome: Progressing  Goal: Anxiety is at manageable level  Description: INTERVENTIONS:  - [place] patient to unit/surroundings  - Establish a trusting relationship with patient  - Discuss possible complications or alterations in birth plan  - Explain treatment plan  - Explain testing/procedures prior to initiation  - Encourage participation in care  - Encourage verbalization of concerns/fears  - Identify coping mechanisms  - Administer/offer alternative therapies (Aroma therapy, distraction, guided imagery, massage, music therapy, therapeutic touch)  - Manage patient's environment (Avoid overstimulation of patient)  Outcome: Progressing  Goal: Demonstrates ability to cope with hospitalization/illness  Description: INTERVENTIONS:  - Encourage verbalization of feelings/concerns/expectations  - Provide quiet environment  - Assist patient to identify own strengths and abilities  - Encourage patient to set small goals for self  - Encourage participation in diversional activity  - Reinforce positive adaptation of new coping behaviors  - Include patient/family/caregiver in decisions  Outcome: Progressing

## 2024-08-03 NOTE — DISCHARGE SUMMARY
[facility]  Discharge Summary    Libertad Castro Patient Status:  Inpatient    1985 MRN [id number]   Location [facility] LABOR & DELIVERY Attending Mary Hsu MD   Hosp Day # 2 PCP None Pcp     Date of Admission: 2024    Date of Discharge: 24      Admitting Diagnosis: PREGNANCY  Pregnancy (HCC)   Vaginal bleeding  AMA  Asthma      Discharge Diagnosis:   Patient Active Problem List   Diagnosis    ASTHMA    History of small bowel obstruction    Engorgement of breasts, postpartum condition or complication (HCC)    Pregnancy (HCC)       Reason for Admission: IUP at 36 6/7 weeks with vaginal bleeding            Hospital Course: 37 y/o   who presented at 33 6/7 weeks with vaginal bleeding  in the office of about 200cc. She was admitted and noted to be rocio and was observed in house. She received steroids for increased risk of PTD. Her fetal status was reassuring and the contractions and bleeding resolved. She was seen my MFM and felt to possibly have and abruption/. She was discharged after steroids and observation in hospital for about 48 hours. She was sent home and instructed on activity and will followup in the office in 2-3 days.          Procedures:  none    Complications: none    Disposition: Home or Self Care    Discharge Condition: Stable    Discharge Medications:   Current Discharge Medication List        CONTINUE these medications which have NOT CHANGED    Details   cholecalciferol (VITAMIN D3) 125 MCG (5000 UT) Oral Cap Take 1 capsule (5,000 Units total) by mouth daily.      prenatal multivitamin plus DHA 27-0.8-228 MG Oral Cap Take 1 capsule by mouth daily.      Levocetirizine Dihydrochloride (XYZAL OR) Take 1 tablet by mouth daily.        ALBUTEROL SULFATE PRN      SYMBICORT IN 2 puffs once daily           STOP taking these medications       aspirin 81 MG Oral Tab EC        ibuprofen 600 MG Oral Tab          Diet: general  Activity: no heavy  lifting, pelvic rest                Alice Wolff MD  8/3/2024  11:20 AM

## 2024-08-03 NOTE — PROGRESS NOTES
Discharge instructions given, Pt verbalized understanding and knows she needs to call to make an appointment for Monday or Tuesday this week.  Pt Assisted in stable condition via wheelchair to parking garage.

## 2024-08-03 NOTE — PLAN OF CARE
Problem: Patient/Family Goals  Goal: Patient/Family Long Term Goal  Description: Patient's Long Term Goal: remain pregnant    Interventions:    - See additional Care Plan goals for specific interventions  Outcome: Progressing  Goal: Patient/Family Short Term Goal  Description: Patient's Short Term Goal: manage bleeding    Interventions:     - See additional Care Plan goals for specific interventions  Outcome: Progressing     Problem: ANTEPARTUM/LABOR and DELIVERY  Goal: Maintain pregnancy as long as maternal and/or fetal condition is stable  Description: INTERVENTIONS:  - Maternal surveillance  - Fetal surveillance  - Monitor uterine activity  - Medications as ordered  - Bedrest  Outcome: Progressing  Goal: Anxiety is at manageable level  Description: INTERVENTIONS:  - [place] patient to unit/surroundings  - Establish a trusting relationship with patient  - Discuss possible complications or alterations in birth plan  - Explain treatment plan  - Explain testing/procedures prior to initiation  - Encourage participation in care  - Encourage verbalization of concerns/fears  - Identify coping mechanisms  - Administer/offer alternative therapies (Aroma therapy, distraction, guided imagery, massage, music therapy, therapeutic touch)  - Manage patient's environment (Avoid overstimulation of patient)  Outcome: Progressing  Goal: Demonstrates ability to cope with hospitalization/illness  Description: INTERVENTIONS:  - Encourage verbalization of feelings/concerns/expectations  - Provide quiet environment  - Assist patient to identify own strengths and abilities  - Encourage patient to set small goals for self  - Encourage participation in diversional activity  - Reinforce positive adaptation of new coping behaviors  - Include patient/family/caregiver in decisions  Outcome: Progressing

## 2024-08-03 NOTE — PROGRESS NOTES
S: doing well, no contractions noted, good FM and she has not had any active bleeding for at least 36 hours.   O: /81 (BP Location: Right arm)   Pulse 58   Temp 98.1 °F (36.7 °C) (Oral)   Resp 18   Ht 5' 7\" (1.702 m)   Wt 205 lb (93 kg)   SpO2 95%   BMI 32.11 kg/m²   Recent Labs   Lab 08/01/24  1206 08/02/24  0834   RBC 4.12 3.53*   HGB 13.3 11.3*   HCT 38.1 33.5*   MCV 92.5 94.9   MCH 32.3 32.0   MCHC 34.9 33.7   RDW 12.2 12.2   NEPRELIM 8.37* 10.68*   WBC 13.6* 14.9*   .0 237.0       Fetal status: reactive and occasional contractions noted    General: alert and oriented x3  Respiratory: non labored breathing  Abdomen: soft nt  Ext: minimal edema, no evidence of dvt      A:  IUP at 34 1/7 weeks       Vaginal bleeding now resolved s/p steroids with this admission       Breech fetus       IVF pregnancy       AMA       History of postpartum anxiety    P: discharge to home and precautions given       Fu in 2- 3 days in the office.

## 2024-08-07 ENCOUNTER — HOSPITAL ENCOUNTER (OUTPATIENT)
Facility: HOSPITAL | Age: 39
Setting detail: OBSERVATION
Discharge: HOME | End: 2024-08-09
Attending: OBSTETRICS & GYNECOLOGY | Admitting: OBSTETRICS & GYNECOLOGY
Payer: COMMERCIAL

## 2024-08-07 LAB
ANTIBODY SCREEN: NEGATIVE
APTT PPP: 26.1 SECONDS (ref 23–36)
BASOPHILS # BLD AUTO: 0.03 X10(3) UL (ref 0–0.2)
BASOPHILS NFR BLD AUTO: 0.2 %
EOSINOPHIL # BLD AUTO: 0.09 X10(3) UL (ref 0–0.7)
EOSINOPHIL NFR BLD AUTO: 0.6 %
ERYTHROCYTE [DISTWIDTH] IN BLOOD BY AUTOMATED COUNT: 12.1 %
FIBRINOGEN PPP-MCNC: 433 MG/DL (ref 200–480)
HCT VFR BLD AUTO: 37.9 %
HGB BLD-MCNC: 13 G/DL
IMM GRANULOCYTES # BLD AUTO: 0.09 X10(3) UL (ref 0–1)
IMM GRANULOCYTES NFR BLD: 0.6 %
INR BLD: 0.97 (ref 0.8–1.2)
KLEIHAUER BETKE RESULT: NEGATIVE
LYMPHOCYTES # BLD AUTO: 4.01 X10(3) UL (ref 1–4)
LYMPHOCYTES NFR BLD AUTO: 27.4 %
MCH RBC QN AUTO: 32.3 PG (ref 26–34)
MCHC RBC AUTO-ENTMCNC: 34.3 G/DL (ref 31–37)
MCV RBC AUTO: 94 FL
MONOCYTES # BLD AUTO: 0.92 X10(3) UL (ref 0.1–1)
MONOCYTES NFR BLD AUTO: 6.3 %
NEUTROPHILS # BLD AUTO: 9.48 X10 (3) UL (ref 1.5–7.7)
NEUTROPHILS # BLD AUTO: 9.48 X10(3) UL (ref 1.5–7.7)
NEUTROPHILS NFR BLD AUTO: 64.9 %
PLATELET # BLD AUTO: 281 10(3)UL (ref 150–450)
PROTHROMBIN TIME: 12.8 SECONDS (ref 11.6–14.8)
RBC # BLD AUTO: 4.03 X10(6)UL
RH BLOOD TYPE: POSITIVE
WBC # BLD AUTO: 14.6 X10(3) UL (ref 4–11)

## 2024-08-07 PROCEDURE — 87150 DNA/RNA AMPLIFIED PROBE: CPT | Performed by: OBSTETRICS & GYNECOLOGY

## 2024-08-07 PROCEDURE — 85610 PROTHROMBIN TIME: CPT | Performed by: OBSTETRICS & GYNECOLOGY

## 2024-08-07 PROCEDURE — 86850 RBC ANTIBODY SCREEN: CPT | Performed by: OBSTETRICS & GYNECOLOGY

## 2024-08-07 PROCEDURE — 85384 FIBRINOGEN ACTIVITY: CPT | Performed by: OBSTETRICS & GYNECOLOGY

## 2024-08-07 PROCEDURE — 96360 HYDRATION IV INFUSION INIT: CPT

## 2024-08-07 PROCEDURE — 87081 CULTURE SCREEN ONLY: CPT | Performed by: OBSTETRICS & GYNECOLOGY

## 2024-08-07 PROCEDURE — 85460 HEMOGLOBIN FETAL: CPT | Performed by: OBSTETRICS & GYNECOLOGY

## 2024-08-07 PROCEDURE — 86901 BLOOD TYPING SEROLOGIC RH(D): CPT | Performed by: OBSTETRICS & GYNECOLOGY

## 2024-08-07 PROCEDURE — 85730 THROMBOPLASTIN TIME PARTIAL: CPT | Performed by: OBSTETRICS & GYNECOLOGY

## 2024-08-07 PROCEDURE — 36415 COLL VENOUS BLD VENIPUNCTURE: CPT

## 2024-08-07 PROCEDURE — 85025 COMPLETE CBC W/AUTO DIFF WBC: CPT | Performed by: OBSTETRICS & GYNECOLOGY

## 2024-08-07 PROCEDURE — 96361 HYDRATE IV INFUSION ADD-ON: CPT

## 2024-08-07 PROCEDURE — 86900 BLOOD TYPING SEROLOGIC ABO: CPT | Performed by: OBSTETRICS & GYNECOLOGY

## 2024-08-07 RX ORDER — CHOLECALCIFEROL (VITAMIN D3) 25 MCG
1 TABLET,CHEWABLE ORAL DAILY
Status: DISCONTINUED | OUTPATIENT
Start: 2024-08-07 | End: 2024-08-07

## 2024-08-07 RX ORDER — ACETAMINOPHEN 500 MG
1000 TABLET ORAL EVERY 6 HOURS PRN
Status: DISCONTINUED | OUTPATIENT
Start: 2024-08-07 | End: 2024-08-09

## 2024-08-07 RX ORDER — ACETAMINOPHEN 500 MG
500 TABLET ORAL EVERY 6 HOURS PRN
Status: DISCONTINUED | OUTPATIENT
Start: 2024-08-07 | End: 2024-08-09

## 2024-08-07 RX ORDER — ZOLPIDEM TARTRATE 5 MG/1
5 TABLET ORAL NIGHTLY PRN
Status: DISCONTINUED | OUTPATIENT
Start: 2024-08-07 | End: 2024-08-09

## 2024-08-07 RX ORDER — CHOLECALCIFEROL (VITAMIN D3) 25 MCG
1 TABLET,CHEWABLE ORAL NIGHTLY
Status: DISCONTINUED | OUTPATIENT
Start: 2024-08-07 | End: 2024-08-09

## 2024-08-07 RX ORDER — SODIUM CHLORIDE, SODIUM LACTATE, POTASSIUM CHLORIDE, CALCIUM CHLORIDE 600; 310; 30; 20 MG/100ML; MG/100ML; MG/100ML; MG/100ML
INJECTION, SOLUTION INTRAVENOUS CONTINUOUS
Status: DISCONTINUED | OUTPATIENT
Start: 2024-08-07 | End: 2024-08-09

## 2024-08-07 RX ORDER — CALCIUM CARBONATE 500 MG/1
1000 TABLET, CHEWABLE ORAL
Status: DISCONTINUED | OUTPATIENT
Start: 2024-08-07 | End: 2024-08-09

## 2024-08-07 RX ORDER — DOCUSATE SODIUM 100 MG/1
100 CAPSULE, LIQUID FILLED ORAL 2 TIMES DAILY
Status: DISCONTINUED | OUTPATIENT
Start: 2024-08-07 | End: 2024-08-09

## 2024-08-07 NOTE — H&P
[facility]    PATIENT'S NAME: HAYES NASH   ATTENDING PHYSICIAN: Liliam Chandler M.D.   PATIENT ACCOUNT#:   [id number]    LOCATION:  [address] [place] [address]  MEDICAL RECORD #:   [id number]       YOB: 1985  ADMISSION DATE:       2024    HISTORY AND PHYSICAL EXAMINATION    CHIEF COMPLAINT:  \"I'm having vaginal bleeding.\"    HISTORY OF PRESENT ILLNESS:  The patient is a 38-year-old female,  5, para 2-0-2-2, EDC 2024, admitted at 34-5/7 weeks' gestation with complaints of vaginal bleeding.  The patient notes that she noticed this bleeding after wiping, and it was more than spotting but not as significant as a period.  She was admitted to the hospital the past week for a large amount of vaginal bleeding.  She was treated with steroids and observed and noted to not have any bleeding for 48 hours.  She was discharged home on light activity.  Good fetal movement.  No nausea, vomiting, fever, chills, diarrhea.  No complaints of contractions.  No other complaints.    PAST MEDICAL HISTORY:  Abdominal adhesions; asthma; back problems; bowel obstruction; malrotation of intestines, situs inversus.  She was premature at birth.    PAST SURGICAL HISTORY:  Appendectomy, breast lumpectomy, hysteroscopy 2023 for placental site nodule resected, exploratory laparotomy in May 2017 for lysis of adhesions.  Patient with history of IVF pregnancies.      MEDICATIONS:  Present medications:  Prenatal vitamins, vitamin D3, ProAir inhaler as needed, baby aspirin.    ALLERGIES:  The patient has no known drug allergies.  She is allergic to bananas, melons, and has seasonal allergies.    FAMILY HISTORY:  Asthma in her father and her brother.  Hodgkin lymphoma in a maternal grandmother.  Hypertension in a maternal grandfather. Ovarian cancer in a paternal grandmother. Heart disease in the paternal grandfather.  Parkinson's in paternal grandfather.  Breast cancer in maternal  aunt.    GYNECOLOGIC HISTORY:  Menarche at age 11.  History of regular menses q.25 days, lasting 4 days.  No history of an abnormal Pap smear.  No history of any sexually transmitted diseases.    OBSTETRICAL HISTORY:   5, para 2-0-2-2, first pregnancy , spontaneous AB.  Second pregnancy 07/15/2019, 40-2/7 weeks, normal vaginal delivery, 7-pound 1.6-ounce male infant, epidural, no complications.  Her son, Arsh, was delivered at [facility] by myself, Dr. Chandler.  On 2021 at 39 weeks, 7 pounds 10 ounces, vaginal delivery, epidural, postpartum anxiety, referred to [facility].  Her daughter, Fabby, was delivered at [facility] by myself, Dr. Chandler.  On 2023, 6-week spontaneous AB.  In , present pregnancy.  Patient is O positive, antibody screen negative x2.  Rubella immune.  Hepatitis B surface antigen negative.  RPR nonreactive x2.  Urine culture negative.  HIV negative x2.  Hemoglobin electrophoresis normal.  GC/chlamydia negative.  Parvo nonimmune.  Hepatitis B surface antigen negative.  The patient declined preimplantation testing and IBT and diagnostic testing.  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  was not tested.  AFP was negative for neural tube defect.  A 1-hour Glucola 128.  Hemoglobin 11.9.  Ferritin 16.  Antibody screen negative x2.    SOCIAL HISTORY:  No tobacco, no EtOH, no drugs.    PHYSICAL EXAMINATION:    GENERAL:  The patient is 5 feet 7 inches.  She weighs approximately 208 pounds.  Blood pressure 135/75, pulse 80, temperature 98.8.  ABDOMEN:  Uterus is approximately 34-week size.  Positive heart tones.  Ultrasound performed in the patient's room, and infant noted to be cephalic presentation.  PELVIC:  Cervix 20%, fingertip to 1 cm, -3 station.  NST reactive.  The patient is noted to have contractions every 2 to 5 minutes on the monitor.  However, she is not feeling them.    ASSESSMENT:    1.   Intrauterine pregnancy at  34-5/7 weeks with third trimester bleeding.    2.   History of third trimester bleeding, status post observation in the hospital and status post steroids on 08/01/2024 and 08/02/2024.    3.   In vitro fertilization pregnancy.    4.   Advanced maternal age.    5.   History of placental site nodule.    6.   History of postpartum anxiety.  7. History of multiple abdominal surgeries secondary to S[place] Inversus  8. History of difficult intubation 2017    PLAN:  The patient observed in triage.  IV hydration initiated.  Patient continued to have contractions.  Patient was discussed with Dr. Sargent.  Recommend observation for 24 to 48 hours  without bleeding and may discharge home if the patient has no further vaginal bleeding.  Consider delivery if the patient has further episodes of acute vaginal bleeding.  Dr. Sargent also recommends that the patient not go past 37 weeks' gestation.  Abruption labs sent.  Beta strep to be done.  We will observe closely.    Dictated By Liliam Chandler M.D.  d: 08/07/2024 13:40:09  t: 08/07/2024 14:14:52  [facility] [id number]  [facility]/

## 2024-08-07 NOTE — PROGRESS NOTES
VSS. Patient states last time she saw bright red blood upon wiping was this morning at 0800. Last time with brown discharge was at 1300 today.

## 2024-08-07 NOTE — PLAN OF CARE
Problem: BIRTH - VAGINAL/ SECTION  Goal: Fetal and maternal status remain reassuring during the birth process  Description: INTERVENTIONS:  - Monitor vital signs  - Monitor fetal heart rate  - Monitor uterine activity  - Monitor labor progression (vaginal delivery)  - DVT prophylaxis (C/S delivery)  - Surgical antibiotic prophylaxis (C/S delivery)  Outcome: Progressing     Problem: PAIN - ADULT  Goal: Verbalizes/displays adequate comfort level or patient's stated pain goal  Description: INTERVENTIONS:  - Encourage pt to monitor pain and request assistance  - Assess pain using appropriate pain scale  - Administer analgesics based on type and severity of pain and evaluate response  - Implement non-pharmacological measures as appropriate and evaluate response  - Consider cultural and social influences on pain and pain management  - Manage/alleviate anxiety  - Utilize distraction and/or relaxation techniques  - Monitor for opioid side effects  - Notify MD/LIP if interventions unsuccessful or patient reports new pain  - Anticipate increased pain with activity and pre-medicate as appropriate  Outcome: Progressing     Problem: ANXIETY  Goal: Will report anxiety at manageable levels  Description: INTERVENTIONS:  - Administer medication as ordered  - Teach and rehearse alternative coping skills  - Provide emotional support with 1:1 interaction with staff  Outcome: Progressing     Problem: Patient/Family Goals  Goal: Patient/Family Long Term Goal  Description: Patient's Long Term Goal: safe delivery of term baby    Interventions:  - communicate POC  - See additional Care Plan goals for specific interventions  Outcome: Progressing  Goal: Patient/Family Short Term Goal  Description: Patient's Short Term Goal: pain control    Interventions:   - communicate POC  - See additional Care Plan goals for specific interventions  Outcome: Progressing

## 2024-08-07 NOTE — PROGRESS NOTES
Pt is a 38 year old female admitted to TR5/TRG5-A.     Chief Complaint   Patient presents with    Vaginal Bleeding     Patient reports scant to small red bleeding this morning since 0400.  Currently not bleeding.  Denies UC or cramping.  Denies LOF.  + Fm      Pt is  34w5d intra-uterine pregnancy.  History obtained, consents signed. Oriented to room, staff, and plan of care.

## 2024-08-08 LAB — GROUP B STREP BY PCR FOR PCR OVT: NEGATIVE

## 2024-08-08 PROCEDURE — 96361 HYDRATE IV INFUSION ADD-ON: CPT

## 2024-08-08 PROCEDURE — 59025 FETAL NON-STRESS TEST: CPT

## 2024-08-08 NOTE — PAYOR COMM NOTE
--------------  DISCHARGE REVIEW    Payor: [facility]  Subscriber #:  [id number]  Authorization Number: [id number]    Admit date: 24  Admit time:   5:45 PM  Discharge Date: 8/3/2024 11:40 AM     Admitting Physician: Mary Hsu MD  Attending Physician:  No att. providers found  Primary Care Physician: Pcp, None          Discharge Summary Notes        Discharge Summary signed by Alice Wolff MD, MD at 8/3/2024 11:25 AM       Author: Alice Wolff MD, MD Specialty: OBSTETRICS & GYNECOLOGY Author Type: Physician    Filed: 8/3/2024 11:25 AM Date of Service: 8/3/2024 11:20 AM Status: Signed    : Alice Wolff MD, MD (Physician)         [place]  Discharge Summary    [facility] Patient Status:  Inpatient    1985 MRN [id number]   Location [facility] LABOR & DELIVERY Attending Mary Hsu MD   Hosp Day # 2 PCP None Pcp     Date of Admission: 2024    Date of Discharge: 24      Admitting Diagnosis: PREGNANCY  Pregnancy (HCC)   Vaginal bleeding  AMA  Asthma      Discharge Diagnosis:   Patient Active Problem List   Diagnosis    ASTHMA    History of small bowel obstruction    Engorgement of breasts, postpartum condition or complication (HCC)    Pregnancy (HCC)       Reason for Admission: IUP at 36 6/7 weeks with vaginal bleeding            Hospital Course: 37 y/o   who presented at 33 6/7 weeks with vaginal bleeding  in the office of about 200cc. She was admitted and noted to be rocio and was observed in house. She received steroids for increased risk of PTD. Her fetal status was reassuring and the contractions and bleeding resolved. She was seen my [facility] and felt to possibly have and abruption/. She was discharged after steroids and observation in hospital for about 48 hours. She was sent home and instructed on activity and will followup in the office in 2-3 days.          Procedures:  none    Complications: none    Disposition: Home or Self  Current Issues/Problems reviewed on call:  SW referral received from Specialty Nurse ANDREA due to patient's need for financial resources.    I spoke with patient on 10/14/22 and she informed me that she is feeling much better physically. She informed me that she is interested in \"any resources\" for financial assistance. She states that it is often difficult to make ends meet.  We discussed the Rental Assistance Program through the [facility]. I will send her information on this as well as local food pantries.  I will also provide her with general financial assistance programs.    I have scheduled a SW follow up call for 10/28/22,  but encouraged her to contact should she have question prior to then.    Call completed with:   Patient    Social Determinants of Health Identified: Coping/Stress and Financial Resource Strain    Community Resources Needed? Yes   If Yes, what resources were provided?  Food Pantries  Rental Assistance Program  General Financial Assistance Programs    Time Frame for Follow up if needed: within 2-3 weeks    SW Goal reviewed with patient. Patient agrees with the SW plan of care and call follow-up plan.     Care Plan reviewed with Angelita Gonzalez on 10/17/22 via EMR   Care    Discharge Condition: Stable    Discharge Medications:   Current Discharge Medication List        CONTINUE these medications which have NOT CHANGED    Details   cholecalciferol (VITAMIN D3) 125 MCG (5000 UT) Oral Cap Take 1 capsule (5,000 Units total) by mouth daily.      prenatal multivitamin plus DHA 27-0.8-228 MG Oral Cap Take 1 capsule by mouth daily.      Levocetirizine Dihydrochloride (XYZAL OR) Take 1 tablet by mouth daily.        ALBUTEROL SULFATE PRN      SYMBICORT IN 2 puffs once daily           STOP taking these medications       aspirin 81 MG Oral Tab EC        ibuprofen 600 MG Oral Tab          Diet: general  Activity: no heavy lifting, pelvic rest                Alice Wolff MD  8/3/2024  11:20 AM    Electronically signed by Alice Wolff MD, MD on 8/3/2024 11:25 AM         REVIEWER COMMENTS

## 2024-08-08 NOTE — PROGRESS NOTES
S: doing well, notices occasional contractions, no new bleeding, good fetal movement,   O: /60 (BP Location: Left arm)   Pulse 65   Temp 98.6 °F (37 °C) (Oral)   Resp 16   Ht 5' 7\" (1.702 m)   Wt 208 lb (94.3 kg)   SpO2 97%   BMI 32.58 kg/m²     Feta; status: reactive nst , ocaasional contractions, moderate variability    General: alert and oriented x3  Respiratory: non labored breathing  Abdomen: soft nt  Pelvic : deferred  Ext: minimal edema, no evidence      A:IUP at 34 6/7 weeks     Vaginal bleeding     IVF pregnancy     AMA     Placental site nodule     History of multiple abdominal surgeries secondary to situs inversus   P: continue careful observation and will monitory for further bleeding       Baby is now cepahalic

## 2024-08-09 VITALS
SYSTOLIC BLOOD PRESSURE: 131 MMHG | RESPIRATION RATE: 15 BRPM | WEIGHT: 208 LBS | TEMPERATURE: 98 F | OXYGEN SATURATION: 97 % | HEIGHT: 67 IN | HEART RATE: 88 BPM | DIASTOLIC BLOOD PRESSURE: 70 MMHG | BODY MASS INDEX: 32.65 KG/M2

## 2024-08-09 PROCEDURE — 99214 OFFICE O/P EST MOD 30 MIN: CPT

## 2024-08-09 NOTE — PLAN OF CARE
Problem: BIRTH - VAGINAL/ SECTION  Goal: Fetal and maternal status remain reassuring during the birth process  Description: INTERVENTIONS:  - Monitor vital signs  - Monitor fetal heart rate  - Monitor uterine activity  - Monitor labor progression (vaginal delivery)  - DVT prophylaxis (C/S delivery)  - Surgical antibiotic prophylaxis (C/S delivery)  2024 1202 by Susan Radford RN  Outcome: Completed  2024 1028 by Susan Radford RN  Outcome: Progressing     Problem: PAIN - ADULT  Goal: Verbalizes/displays adequate comfort level or patient's stated pain goal  Description: INTERVENTIONS:  - Encourage pt to monitor pain and request assistance  - Assess pain using appropriate pain scale  - Administer analgesics based on type and severity of pain and evaluate response  - Implement non-pharmacological measures as appropriate and evaluate response  - Consider cultural and social influences on pain and pain management  - Manage/alleviate anxiety  - Utilize distraction and/or relaxation techniques  - Monitor for opioid side effects  - Notify MD/LIP if interventions unsuccessful or patient reports new pain  - Anticipate increased pain with activity and pre-medicate as appropriate  2024 1202 by Susan Radford RN  Outcome: Completed  2024 1028 by Susan Radford RN  Outcome: Progressing     Problem: ANXIETY  Goal: Will report anxiety at manageable levels  Description: INTERVENTIONS:  - Administer medication as ordered  - Teach and rehearse alternative coping skills  - Provide emotional support with 1:1 interaction with staff  2024 1202 by Susan Radford RN  Outcome: Completed  2024 1028 by Susan Radford RN  Outcome: Progressing     Problem: Patient/Family Goals  Goal: Patient/Family Long Term Goal  Description: Patient's Long Term Goal: safe delivery of term baby    Interventions:  - communicate POC  - See additional Care Plan goals for specific interventions  2024  1202 by Susan Radford RN  Outcome: Completed  8/9/2024 1028 by Susan Radford RN  Outcome: Progressing  Goal: Patient/Family Short Term Goal  Description: Patient's Short Term Goal: pain control    Interventions:   - communicate POC  - See additional Care Plan goals for specific interventions  8/9/2024 1202 by Susan Radford RN  Outcome: Completed  8/9/2024 1028 by Susan Radford RN  Outcome: Progressing

## 2024-08-09 NOTE — PROGRESS NOTES
NURSING DISCHARGE NOTE    Discharged pt via ambulation to [facility].  Accompanied by staff.  Belongings at hand.  IV discontinued.  Discharge instruction and education given to pt and verbalizes understanding. All questions and concerns addressed.

## 2024-08-09 NOTE — DISCHARGE INSTRUCTIONS
Labor Discharge Instructions    Call your OB doctor if you have any of the following signs:    Period-like cramps that may come and go  Low, dull backache  Pressure in your lower abdomen that may feel like the baby is pushing down  Change in the type or amount of vaginal discharge  Abdominal cramps that may be accompanied by diarrhea  Fluid leaking from your vagina (may or may not be bloody)  Uterine Activity Instructions:         Labor Discharge Instructions    Call your OB doctor if you have any of the following signs:    Period-like cramps that may come and go  Low, dull backache  Pressure in your lower abdomen that may feel like the baby is pushing down  Change in the type or amount of vaginal discharge  Abdominal cramps that may be accompanied by diarrhea  Fluid leaking from your vagina (may or may not be bloody)  Uterine Activity Instructions:

## 2024-08-09 NOTE — PROGRESS NOTES
[facility]  Antepartum    Libertad Cely Beach Schillerstrom Patient Status:  Observation    1985 MRN [id number]   Location [facility] 1SW-J Attending Liliam [place] MD   Hosp Day # 0 PCP None Pcp     SUBJECTIVE:    Interval History: has complaints noticing some spotting on wiping. She is wanting to get discharge home today.    Denies any regular Cx, LOF. Feels irregularcx. No apin.    OBJECTIVE:  /70   Pulse 88   Temp 98.4 °F (36.9 °C) (Oral)   Resp 15   Ht 5' 7\" (1.702 m)   Wt 208 lb (94.3 kg)   SpO2 97%   BMI 32.58 kg/m²   Vital signs in last 24 hours:  Temp:  [97.4 °F (36.3 °C)-98.4 °F (36.9 °C)] 98.4 °F (36.9 °C)  Pulse:  [60-88] 88  Resp:  [15-16] 15  BP: (128-153)/(67-83) 131/70       Fetal Surveillance:  Fetal heart variability: moderate  Fetal Heart Rate accelerations: yes  Baseline FHR: 140 per minute    Lab data:                  ASSESSMENT:  Patient Active Problem List   Diagnosis    ASTHMA    History of small bowel obstruction    Engorgement of breasts, postpartum condition or complication (HCC)    Pregnancy (HCC)     IUP at 35 wks with     Vaginal bleeding     IVF pregnancy     AMA     Placental site nodule     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 Carla ROMERO and the plan is to discharge home with precautions to return as needed with any concerning Cx/VB. All the precautions given and to avoid any strenuous activities, light activity disc.   Recommended to F/u as scheduled on Wednesday in the office.    Genet Vinson MD  2024  11:53 AM

## 2024-08-22 ENCOUNTER — APPOINTMENT (OUTPATIENT)
Dept: OBGYN CLINIC | Facility: HOSPITAL | Age: 39
End: 2024-08-22
Payer: COMMERCIAL

## 2024-08-22 ENCOUNTER — ANESTHESIA EVENT (OUTPATIENT)
Dept: OBGYN UNIT | Facility: HOSPITAL | Age: 39
End: 2024-08-22
Payer: COMMERCIAL

## 2024-08-22 ENCOUNTER — HOSPITAL ENCOUNTER (INPATIENT)
Facility: HOSPITAL | Age: 39
LOS: 1 days | Discharge: HOME | End: 2024-08-23
Attending: OBSTETRICS & GYNECOLOGY | Admitting: OBSTETRICS & GYNECOLOGY
Payer: COMMERCIAL

## 2024-08-22 ENCOUNTER — ANESTHESIA (OUTPATIENT)
Dept: OBGYN UNIT | Facility: HOSPITAL | Age: 39
End: 2024-08-22
Payer: COMMERCIAL

## 2024-08-22 LAB
ANTIBODY SCREEN: NEGATIVE
BASOPHILS # BLD AUTO: 0.04 X10(3) UL (ref 0–0.2)
BASOPHILS NFR BLD AUTO: 0.4 %
EOSINOPHIL # BLD AUTO: 0.17 X10(3) UL (ref 0–0.7)
EOSINOPHIL NFR BLD AUTO: 1.6 %
ERYTHROCYTE [DISTWIDTH] IN BLOOD BY AUTOMATED COUNT: 12.5 %
HCT VFR BLD AUTO: 36 %
HGB BLD-MCNC: 12.5 G/DL
IMM GRANULOCYTES # BLD AUTO: 0.05 X10(3) UL (ref 0–1)
IMM GRANULOCYTES NFR BLD: 0.5 %
LYMPHOCYTES # BLD AUTO: 3.41 X10(3) UL (ref 1–4)
LYMPHOCYTES NFR BLD AUTO: 31.6 %
MCH RBC QN AUTO: 32 PG (ref 26–34)
MCHC RBC AUTO-ENTMCNC: 34.7 G/DL (ref 31–37)
MCV RBC AUTO: 92.1 FL
MONOCYTES # BLD AUTO: 0.85 X10(3) UL (ref 0.1–1)
MONOCYTES NFR BLD AUTO: 7.9 %
NEUTROPHILS # BLD AUTO: 6.26 X10 (3) UL (ref 1.5–7.7)
NEUTROPHILS # BLD AUTO: 6.26 X10(3) UL (ref 1.5–7.7)
NEUTROPHILS NFR BLD AUTO: 58 %
PLATELET # BLD AUTO: 234 10(3)UL (ref 150–450)
RBC # BLD AUTO: 3.91 X10(6)UL
RH BLOOD TYPE: POSITIVE
T PALLIDUM AB SER QL IA: NONREACTIVE
WBC # BLD AUTO: 10.8 X10(3) UL (ref 4–11)

## 2024-08-22 PROCEDURE — 88307 TISSUE EXAM BY PATHOLOGIST: CPT | Performed by: OBSTETRICS & GYNECOLOGY

## 2024-08-22 PROCEDURE — 3E033VJ INTRODUCTION OF OTHER HORMONE INTO PERIPHERAL VEIN, PERCUTANEOUS APPROACH: ICD-10-PCS | Performed by: OBSTETRICS & GYNECOLOGY

## 2024-08-22 PROCEDURE — 85025 COMPLETE CBC W/AUTO DIFF WBC: CPT | Performed by: OBSTETRICS & GYNECOLOGY

## 2024-08-22 PROCEDURE — 86901 BLOOD TYPING SEROLOGIC RH(D): CPT | Performed by: OBSTETRICS & GYNECOLOGY

## 2024-08-22 PROCEDURE — 86850 RBC ANTIBODY SCREEN: CPT | Performed by: OBSTETRICS & GYNECOLOGY

## 2024-08-22 PROCEDURE — 10907ZC DRAINAGE OF AMNIOTIC FLUID, THERAPEUTIC FROM PRODUCTS OF CONCEPTION, VIA NATURAL OR ARTIFICIAL OPENING: ICD-10-PCS | Performed by: OBSTETRICS & GYNECOLOGY

## 2024-08-22 PROCEDURE — 86900 BLOOD TYPING SEROLOGIC ABO: CPT | Performed by: OBSTETRICS & GYNECOLOGY

## 2024-08-22 PROCEDURE — 86780 TREPONEMA PALLIDUM: CPT | Performed by: OBSTETRICS & GYNECOLOGY

## 2024-08-22 RX ORDER — IBUPROFEN 600 MG/1
600 TABLET, FILM COATED ORAL ONCE AS NEEDED
Status: DISCONTINUED | OUTPATIENT
Start: 2024-08-22 | End: 2024-08-22 | Stop reason: HOSPADM

## 2024-08-22 RX ORDER — ONDANSETRON 2 MG/ML
4 INJECTION INTRAMUSCULAR; INTRAVENOUS EVERY 6 HOURS PRN
Status: DISCONTINUED | OUTPATIENT
Start: 2024-08-22 | End: 2024-08-22 | Stop reason: HOSPADM

## 2024-08-22 RX ORDER — DEXTROSE, SODIUM CHLORIDE, SODIUM LACTATE, POTASSIUM CHLORIDE, AND CALCIUM CHLORIDE 5; .6; .31; .03; .02 G/100ML; G/100ML; G/100ML; G/100ML; G/100ML
INJECTION, SOLUTION INTRAVENOUS AS NEEDED
Status: DISCONTINUED | OUTPATIENT
Start: 2024-08-22 | End: 2024-08-22 | Stop reason: HOSPADM

## 2024-08-22 RX ORDER — BUPIVACAINE HCL/0.9 % NACL/PF 0.25 %
5 PLASTIC BAG, INJECTION (ML) EPIDURAL AS NEEDED
Status: DISCONTINUED | OUTPATIENT
Start: 2024-08-22 | End: 2024-08-22

## 2024-08-22 RX ORDER — DOCUSATE SODIUM 100 MG/1
100 CAPSULE, LIQUID FILLED ORAL
Status: DISCONTINUED | OUTPATIENT
Start: 2024-08-22 | End: 2024-08-23

## 2024-08-22 RX ORDER — ACETAMINOPHEN 500 MG
500 TABLET ORAL EVERY 6 HOURS PRN
Status: DISCONTINUED | OUTPATIENT
Start: 2024-08-22 | End: 2024-08-23

## 2024-08-22 RX ORDER — NALBUPHINE HYDROCHLORIDE 10 MG/ML
2.5 INJECTION, SOLUTION INTRAMUSCULAR; INTRAVENOUS; SUBCUTANEOUS
Status: DISCONTINUED | OUTPATIENT
Start: 2024-08-22 | End: 2024-08-22

## 2024-08-22 RX ORDER — SIMETHICONE 80 MG
80 TABLET,CHEWABLE ORAL 3 TIMES DAILY PRN
Status: DISCONTINUED | OUTPATIENT
Start: 2024-08-22 | End: 2024-08-23

## 2024-08-22 RX ORDER — SODIUM CHLORIDE, SODIUM LACTATE, POTASSIUM CHLORIDE, CALCIUM CHLORIDE 600; 310; 30; 20 MG/100ML; MG/100ML; MG/100ML; MG/100ML
INJECTION, SOLUTION INTRAVENOUS CONTINUOUS
Status: DISCONTINUED | OUTPATIENT
Start: 2024-08-22 | End: 2024-08-22 | Stop reason: HOSPADM

## 2024-08-22 RX ORDER — TERBUTALINE SULFATE 1 MG/ML
0.25 INJECTION, SOLUTION SUBCUTANEOUS AS NEEDED
Status: DISCONTINUED | OUTPATIENT
Start: 2024-08-22 | End: 2024-08-22 | Stop reason: HOSPADM

## 2024-08-22 RX ORDER — IBUPROFEN 600 MG/1
600 TABLET, FILM COATED ORAL EVERY 6 HOURS
Status: DISCONTINUED | OUTPATIENT
Start: 2024-08-22 | End: 2024-08-23

## 2024-08-22 RX ORDER — CITRIC ACID/SODIUM CITRATE 334-500MG
30 SOLUTION, ORAL ORAL AS NEEDED
Status: DISCONTINUED | OUTPATIENT
Start: 2024-08-22 | End: 2024-08-22 | Stop reason: HOSPADM

## 2024-08-22 RX ORDER — ACETAMINOPHEN 500 MG
500 TABLET ORAL EVERY 6 HOURS PRN
Status: DISCONTINUED | OUTPATIENT
Start: 2024-08-22 | End: 2024-08-22 | Stop reason: HOSPADM

## 2024-08-22 RX ORDER — BISACODYL 10 MG
10 SUPPOSITORY, RECTAL RECTAL ONCE AS NEEDED
Status: DISCONTINUED | OUTPATIENT
Start: 2024-08-22 | End: 2024-08-23

## 2024-08-22 RX ORDER — ACETAMINOPHEN 500 MG
1000 TABLET ORAL EVERY 6 HOURS PRN
Status: DISCONTINUED | OUTPATIENT
Start: 2024-08-22 | End: 2024-08-23

## 2024-08-22 RX ORDER — ACETAMINOPHEN 500 MG
1000 TABLET ORAL EVERY 6 HOURS PRN
Status: DISCONTINUED | OUTPATIENT
Start: 2024-08-22 | End: 2024-08-22 | Stop reason: HOSPADM

## 2024-08-22 NOTE — ANESTHESIA PROCEDURE NOTES
Labor Analgesia    Date/Time: 8/22/2024 10:41 AM    Performed by: Jeremy Cordero DO  Authorized by: Jeremy Cordero DO      General Information and Staff    Start Time:  8/22/2024 10:41 AM  End Time:  8/22/2024 10:44 AM  Anesthesiologist:  Jeremy Cordero DO  Performed by:  Anesthesiologist  Patient Location:  OB  Site Identification: surface landmarks    Reason for Block: labor epidural    Preanesthetic Checklist: patient identified, IV checked, risks and benefits discussed, monitors and equipment checked, pre-op evaluation, timeout performed, IV bolus, anesthesia consent and sterile technique used      Procedure Details    Patient Position:  Sitting  Prep: ChloraPrep    Monitoring:  Heart rate and continuous pulse ox  Approach:  Midline    Epidural Needle    Injection Technique:  KAMILA air  Needle Type:  Tuohy  Needle Gauge:  17 G  Needle Length:  3.375 in  Needle Insertion Depth:  5  Location:  L2-3    Spinal Needle      Catheter    Catheter Type:  End hole  Catheter Size:  19 G  Catheter at Skin Depth:  12  Test Dose:  Negative    Assessment    Sensory Level:  T10    Additional Comments

## 2024-08-22 NOTE — PROGRESS NOTES
Patient unable to void at this time. Amber care done w/ pt's permission. Pt given clean gown. Patient transferred to mother/baby room 2196 per wheelchair in stable condition with baby and personal belongings. Accompanied by significant other and staff.  Report given to Clotilde mother/baby RN.

## 2024-08-22 NOTE — PLAN OF CARE
Problem: BIRTH - VAGINAL/ SECTION  Goal: Fetal and maternal status remain reassuring during the birth process  Description: INTERVENTIONS:  - Monitor vital signs  - Monitor fetal heart rate  - Monitor uterine activity  - Monitor labor progression (vaginal delivery)  - DVT prophylaxis (C/S delivery)  - Surgical antibiotic prophylaxis (C/S delivery)  Outcome: Progressing     Problem: PAIN - ADULT  Goal: Verbalizes/displays adequate comfort level or patient's stated pain goal  Description: INTERVENTIONS:  - Encourage pt to monitor pain and request assistance  - Assess pain using appropriate pain scale  - Administer analgesics based on type and severity of pain and evaluate response  - Implement non-pharmacological measures as appropriate and evaluate response  - Consider cultural and social influences on pain and pain management  - Manage/alleviate anxiety  - Utilize distraction and/or relaxation techniques  - Monitor for opioid side effects  - Notify MD/LIP if interventions unsuccessful or patient reports new pain  - Anticipate increased pain with activity and pre-medicate as appropriate  Outcome: Progressing     Problem: ANXIETY  Goal: Will report anxiety at manageable levels  Description: INTERVENTIONS:  - Administer medication as ordered  - Teach and rehearse alternative coping skills  - Provide emotional support with 1:1 interaction with staff  Outcome: Progressing     Problem: Patient/Family Goals  Goal: Patient/Family Long Term Goal  Description: Patient's Long Term Goal: Uncomplicated  section    Interventions:    VS per protocol  EFM per protocol  I&O  Conley catheter  Abd prep with clippers as needed  SCD to bilateral lower extremities  NPO  Insert/maintain IV access  Antibiotics per protocol  Informed consent      Interventions:  - See additional Care Plan goals for specific interventions  Outcome: Progressing  Goal: Patient/Family Short Term Goal  Description: Patient's Short Term Goal:  Adequate pain control with delivery of infant  Interventions:  Pain assessment scores as ordered  Patient scores pain a \"3\" or less  Multidisciplinary care   Nonpharmacologic comfort measures        Interventions:   - See additional Care Plan goals for specific interventions  Outcome: Progressing

## 2024-08-22 NOTE — ANESTHESIA PREPROCEDURE EVALUATION
PRE-OP EVALUATION    Patient Name: Libertad [place]    Admit Diagnosis: Pregnancy (HCC) [Z34.90]    Pre-op Diagnosis: * No pre-op diagnosis entered *        Anesthesia Procedure: LABOR ANALGESIA    * No surgeons found in log *    Pre-op vitals reviewed.  Temp: 98.3 °F (36.8 °C)  Pulse: 90  Resp: 16  BP: 128/73  SpO2: 98 %  Body mass index is 31.32 kg/m².    Current medications reviewed.  Hospital Medications:   lactated ringers infusion   Intravenous Continuous    dextrose in lactated ringers 5% infusion   Intravenous PRN    lactated ringers IV bolus 500 mL  500 mL Intravenous PRN    acetaminophen (Tylenol Extra Strength) tab 500 mg  500 mg Oral Q6H PRN    acetaminophen (Tylenol Extra Strength) tab 1,000 mg  1,000 mg Oral Q6H PRN    ibuprofen (Motrin) tab 600 mg  600 mg Oral Once PRN    ondansetron (Zofran) 4 MG/2ML injection 4 mg  4 mg Intravenous Q6H PRN    oxyTOCIN in sodium chloride 0.9% (Pitocin) 30 Units/500mL infusion premix  62.5-900 chuck-units/min Intravenous Continuous    terbutaline (Brethine) 1 MG/ML injection 0.25 mg  0.25 mg Subcutaneous PRN    sodium citrate-citric acid (Bicitra) 500-334 MG/5ML oral solution 30 mL  30 mL Oral PRN    oxyTOCIN in sodium chloride 0.9% (Pitocin) 30 Units/500mL infusion premix  0.5-20 chuck-units/min Intravenous Continuous    lactated ringers IV bolus 1,000 mL  1,000 mL Intravenous Once    fentaNYL-bupivacaine 2 mcg/mL-0.125% in sodium chloride 0.9% 100 mL EPIDURAL infusion premix  12 mL/hr Epidural Continuous    fentaNYL (Sublimaze) 50 mcg/mL injection 100 mcg  100 mcg Epidural Once    EPHEDrine (PF) 25 MG/5 ML injection 5 mg  5 mg Intravenous PRN    nalbuphine (Nubain) 10 mg/mL injection 2.5 mg  2.5 mg Intravenous Q15 Min PRN       Outpatient Medications:     Medications Prior to Admission   Medication Sig Dispense Refill Last Dose    cholecalciferol (VITAMIN D3) 125 MCG (5000 UT) Oral Cap Take 1 capsule (5,000 Units total) by mouth daily.   8/21/2024     prenatal multivitamin plus DHA 27-0.8-228 MG Oral Cap Take 1 capsule by mouth daily.   8/21/2024    ALBUTEROL SULFATE PRN   Past Month    Levocetirizine Dihydrochloride (XYZAL OR) Take 1 tablet by mouth daily.     8/20/2024    SYMBICORT IN 2 puffs once daily (Patient not taking: Reported on 4/26/2024)   Unknown       Allergies: Bananas, Melons, and Seasonal      Anesthesia Evaluation        Anesthetic Complications  (-) history of anesthetic complications         GI/Hepatic/Renal    Negative GI/hepatic/renal ROS.                             Cardiovascular            MET: >4                                           Endo/Other    Negative endo/other ROS.                              Pulmonary      (+) asthma                     Neuro/Psych    Negative neuro/psych ROS.                          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  Lab Results   Component Value Date    WBC 10.8 08/22/2024    RBC 3.91 08/22/2024    HGB 12.5 08/22/2024    HCT 36.0 08/22/2024    MCV 92.1 08/22/2024    MCH 32.0 08/22/2024    MCHC 34.7 08/22/2024    RDW 12.5 08/22/2024    .0 08/22/2024        Lab Results   Component Value Date    INR 0.97 08/07/2024         Airway      Mallampati: II  Mouth  opening: 3 FB  TM distance: > 6 cm  Neck ROM: full Cardiovascular      Rhythm: regular  Rate: normal     Dental             Pulmonary      Breath sounds clear to auscultation bilaterally.               Other findings              ASA: 2   Plan: epidural  NPO status verified and         Comment: discussed  Plan/risks discussed with: patient                Present on Admission:  **None**

## 2024-08-22 NOTE — H&P
[facility]    PATIENT'S NAME: HAYES NASH   ATTENDING PHYSICIAN: Liliam [place] M.D.   PATIENT ACCOUNT#:   [id number]    LOCATION:  [address] [place] [address]  MEDICAL RECORD #:   [id number]       YOB: 1985  ADMISSION DATE:       2024    HISTORY AND PHYSICAL EXAMINATION    CHIEF COMPLAINT:  \"I'm here for induction.\"    HISTORY OF PRESENT ILLNESS:  The patient is a 38-year-old female,  5, para 2-0-0-2, admitted at 36-6/7 weeks' gestation for induction of labor secondary to persistent admission for vaginal bleeding.  The patient has had 2 previous admissions for vaginal bleeding.  The patient was discussed with Dr. Aguirre who recommended proceeding with delivery.  Good fetal movement.  No present vaginal bleeding.  No nausea, vomiting, fever, chills, diarrhea.  No other complaints.  This is an IVF pregnancy with frozen embryo transfer.  The patient has been taking prophylactic aspirin for advanced maternal age in IVF.  The patient has had multiple abdominal surgeries for situs inversus since birth and as a child, as well as for abdominal adhesions causing bowel obstruction.  She had a placental site nodule that was resected hysteroscopically in 2023.    PAST MEDICAL HISTORY:  History of abdominal adhesions.  She had a bowel obstruction in , , and .  Herniated disc, L4, L5.  Inversus at birth, .      PAST SURGICAL HISTORY:  In , appendectomy; adenoma surgery x2 in ; hysteroscopy 2023; resection of placental site nodule with 4 lysis of adhesions , ; laparotomy for abdominal adhesions; bowel obstruction; situs inversus surgery 9 days old.    MEDICATIONS:  Present medications:  Prenatal vitamins, vitamin D3, ProAir inhaler, and Zyrtec.  ProAir HFA in the fall of  with exercise    ALLERGIES:  No known drug allergies.  No latex allergy.    FAMILY HISTORY:  Asthma in her father and brother, breast cancer in her aunt age 55,  ovarian cancer in paternal grandmother, heart disease in a paternal grandfather, Hodgkin lymphoma in a maternal grandmother, hypertension in a maternal grandfather, Parkinson disease in a paternal grandfather.    GYNECOLOGICAL HISTORY:  Menarche age 11.  History of regular menses every 25 days, lasting 4 days.  No history of any sexually transmitted diseases.  No history of an abnormal Pap smear.    OBSTETRICAL HISTORY:  On 05/01/2018, Clomid IUI, spontaneous miscarriage.  On 07/15/2019, 40 weeks, 16 hours of labor, 7 pounds 1.6 ounce male infant, normal vaginal delivery, epidural, advanced maternal age, IVF pregnancy, history of abdominal situs inversus, induction of labor, 45-minute second stage.  Her son, Joao, was delivered at [facility] by myself, Dr. Liliam [place].  On 09/17/2021, 39 weeks, 7 pounds 10 ounce female infant, epidural, spontaneous pregnancy, advanced maternal age, postpartum anxiety, referred to [facility] on 10/14/2021.  Her daughter, Fabby, was delivered by myself, Dr. [place].  On 11/04/2023, spontaneous AB at 6 weeks, and then hysteroscopic resection of placental site in December 2023.  In 2024, present pregnancy.  Patient is noted to be O positive.  Antibody screen negative x2.  Rubella immune.  Hepatitis B surface antigen nonreactive.  RPR nonreactive x2.  HIV negative x2.  Urine culture negative.  Hemoglobin electrophoresis normal.  GC, chlamydia negative.  Parvo nonimmune.  Hepatitis C antibody negative.  The patient declined genetic preimplantation testing, NIPT, and diagnostic testing.  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   was not tested.  AFP negative.  One-hour Glucola 128.  Antibody screen negative x2.  Her beta strep was negative and was done at [facility].    SOCIAL HISTORY:  No tobacco, no ETOH, no drugs.    PHYSICAL EXAMINATION:    VITAL SIGNS:  The patient is 5 feet 6-1/2 inches.  She weighs approximately 199 pounds.   Blood pressure 132/70.    ABDOMEN:  Term uterus.  Estimated fetal weight approximately 7 pounds.  Cephalic.  Positive fetal heart tones.  PELVIC:  Cervix 20%, 1 cm, -3 station.    ASSESSMENT:    1.   Intrauterine pregnancy 36-6/7 weeks.  2.   Recurrent admission for vaginal bleeding.  3.   In vitro fertilization pregnancy.  4.   Placental site nodule resected hysteroscopically, 2023.  5.   Cervical polyp noted at initial visit.  6.   Advanced maternal age.  7.   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  8.   Elevated blood pressures in the office, but normal home blood pressure monitoring.  9.   History of asthma.     PLAN:  Patient presently scheduled for induction of labor secondary to the above issues.  This was discussed with Dr. Aguirre who recommended proceeding with delivery.  Routine admission orders.  Patient desires epidural.  We will observe closely.  Anticipate .    Dictated By Liliam [place] M.D.  d: 2024 01:10:58  t: 2024 02:04:36  Job [id number]  [facility]/

## 2024-08-22 NOTE — PROGRESS NOTES
Pt is a 38 year old female admitted to 114/114-A.     Chief Complaint   Patient presents with    Scheduled Induction      Pt is  36w6d intra-uterine pregnancy.  History obtained, consents signed. Oriented to room, staff, and plan of care. Pt states she feels baby moving now. She denies strong contractions.

## 2024-08-23 VITALS
TEMPERATURE: 98 F | BODY MASS INDEX: 31 KG/M2 | SYSTOLIC BLOOD PRESSURE: 122 MMHG | HEART RATE: 47 BPM | RESPIRATION RATE: 16 BRPM | OXYGEN SATURATION: 100 % | DIASTOLIC BLOOD PRESSURE: 70 MMHG | WEIGHT: 200 LBS

## 2024-08-23 PROBLEM — Z34.90 PREGNANCY (HCC): Status: RESOLVED | Noted: 2024-08-01 | Resolved: 2024-08-23

## 2024-08-23 LAB
BASOPHILS # BLD AUTO: 0.03 X10(3) UL (ref 0–0.2)
BASOPHILS NFR BLD AUTO: 0.3 %
EOSINOPHIL # BLD AUTO: 0.2 X10(3) UL (ref 0–0.7)
EOSINOPHIL NFR BLD AUTO: 1.9 %
ERYTHROCYTE [DISTWIDTH] IN BLOOD BY AUTOMATED COUNT: 12.1 %
HCT VFR BLD AUTO: 32.6 %
HGB BLD-MCNC: 11.2 G/DL
IMM GRANULOCYTES # BLD AUTO: 0.06 X10(3) UL (ref 0–1)
IMM GRANULOCYTES NFR BLD: 0.6 %
LYMPHOCYTES # BLD AUTO: 3.58 X10(3) UL (ref 1–4)
LYMPHOCYTES NFR BLD AUTO: 33.1 %
MCH RBC QN AUTO: 31.6 PG (ref 26–34)
MCHC RBC AUTO-ENTMCNC: 34.4 G/DL (ref 31–37)
MCV RBC AUTO: 92.1 FL
MONOCYTES # BLD AUTO: 0.93 X10(3) UL (ref 0.1–1)
MONOCYTES NFR BLD AUTO: 8.6 %
NEUTROPHILS # BLD AUTO: 6.01 X10 (3) UL (ref 1.5–7.7)
NEUTROPHILS # BLD AUTO: 6.01 X10(3) UL (ref 1.5–7.7)
NEUTROPHILS NFR BLD AUTO: 55.5 %
PLATELET # BLD AUTO: 183 10(3)UL (ref 150–450)
RBC # BLD AUTO: 3.54 X10(6)UL
WBC # BLD AUTO: 10.8 X10(3) UL (ref 4–11)

## 2024-08-23 PROCEDURE — 85025 COMPLETE CBC W/AUTO DIFF WBC: CPT | Performed by: OBSTETRICS & GYNECOLOGY

## 2024-08-23 NOTE — PLAN OF CARE
Problem: Patient/Family Goals  Goal: Patient/Family Long Term Goal  Description: Patient's Long Term Goal: Uncomplicated  section    Interventions:    VS per protocol  EFM per protocol  I&O  Conley catheter  Abd prep with clippers as needed  SCD to bilateral lower extremities  NPO  Insert/maintain IV access  Antibiotics per protocol  Informed consent      Interventions:  - See additional Care Plan goals for specific interventions  Outcome: Progressing  Goal: Patient/Family Short Term Goal  Description: Patient's Short Term Goal: Adequate pain control with delivery of infant  Interventions:  Pain assessment scores as ordered  Patient scores pain a \"3\" or less  Multidisciplinary care   Nonpharmacologic comfort measures        Interventions:   - See additional Care Plan goals for specific interventions  Outcome: Progressing     Problem: POSTPARTUM  Goal: Long Term Goal:Experiences normal postpartum course  Description: INTERVENTIONS:  - Assess and monitor vital signs and lab values.  - Assess fundus and lochia.  - Provide ice/sitz baths for perineum discomfort.  - Monitor healing of incision/episiotomy/laceration, and assess for signs and symptoms of infection and hematoma.  - Assess bladder function and monitor for bladder distention.  - Provide/instruct/assist with pericare as needed.  - Provide VTE prophylaxis as needed.  - Monitor bowel function.  - Encourage ambulation and provide assistance as needed.  - Assess and monitor emotional status and provide social service/psych resources as needed.  - Utilize standard precautions and use personal protective equipment as indicated. Ensure aseptic care of all intravenous lines and invasive tubes/drains.  - Obtain immunization and exposure to communicable diseases history.  Outcome: Progressing  Goal: Appropriate maternal -  bonding  Description: INTERVENTIONS:  - Assess caregiver- interactions.  - Assess caregiver's emotional status and coping  mechanisms.  - Encourage caregiver to participate in  daily care.  - Assess support systems available to mother/family.  - Provide /case management support as needed.  Outcome: Progressing

## 2024-08-23 NOTE — PROGRESS NOTES
[facility]  Post-Partum Vaginal Delivery Progress Note    [facility]:  In[facility]    1985 MRN [id number]   Location [facility] 2SW-J Attending Liliam [place] MD   Hosp Day # 1 PCP None Pcp     SUBJECTIVE:    Postpartum Day 1 s/p vaginal delivery    The patient is without complaints. Lochia appropriate. Pain is well controlled with current medications.       OBJECTIVE:    Vital signs in last 24 hours:  Temp:  [97.8 °F (36.6 °C)-98.7 °F (37.1 °C)] 97.9 °F (36.6 °C)  Pulse:  [] 47  Resp:  [16-18] 16  BP: ()/() 122/70  SpO2:  [96 %-100 %] 100 %        Data Reviewed:  Recent Labs   Lab 24  0823 24  0815   RBC 3.91 3.54*   HGB 12.5 11.2*   HCT 36.0 32.6*   MCV 92.1 92.1   MCH 32.0 31.6   MCHC 34.7 34.4   RDW 12.5 12.1   NEPRELIM 6.26 6.01   WBC 10.8 10.8   .0 183.0         ASSESSMENT:   Post Partum Day # 1 S/P Normal Spontaneous Vaginal Delivery    PLAN:  Routine Post partum care.  Desires discharge home today.      Mary Hsu MD  2024  9:34 AM

## 2024-08-23 NOTE — PROGRESS NOTES
Patient up and about, AOx4, VSS, in stable condition, seen by OB ok to go home, postpartum care and  care reviewed and completed and patient verbalized understanding and follow up appointments are made. Spouse at bedside very helpful, patient signed infant release electronically,

## 2024-08-23 NOTE — L&D DELIVERY NOTE
[facility]    PATIENT'S NAME: HAYES NASH   ATTENDING PHYSICIAN: Liliam [place] M.D.   PATIENT ACCOUNT #: [id number] LOCATION:  [address] [place] [address]   MEDICAL RECORD #: [id number] YOB: 1985   ADMISSION DATE: 2024 DELIVERY DATE: 2024     DELIVERY NOTE     #####EDITING MAY BE REQUIRED#####     The patient is a 38-year-old female,  5, para 2-0-2-2, EDC 2024, admitted at 36-6/7 weeks' gestation for induction of labor secondary to suspected chronic abruption.  The patient has been followed closely in this pregnancy.  She has been admitted twice for vaginal bleeding.  Her workup was negative, but the decision was to proceed with induction as discussed with Dr. Aguirre.  The patient also has a history of IVF and AMA.  The patient's cervix on admission was noted to be 20%, 1 cm, -2 station.  The patient had Pitocin initiated.  She had an epidural placed, and then she had artificial rupture of membranes performed, clear fluid, at 11:25 a.m.  The patient progressed satisfactorily to complete cervical dilation at 1607, and for shortly thereafter _______, she began pushing.  The patient then had a normal spontaneous vaginal delivery of a viable female infant.  Birth weight was 2960 g, 6 pounds 8.4 ounces, Apgars 8 and 9.  Delayed cord clamping was performed.  The cord was clamped and cut, and the infant was placed on the maternal abdomen.  There was noted to be a large blood clot before and after delivery.  The patient then had some vaginal bleeding, so manual removal of placenta was performed.  Ancef 2 g IV piggyback.  The uterus then appeared to be firm.  The cervix, vagina, rectum, and perineum were visually and manually inspected and noted to be intact.  QBL was noted to be 228.    ASSESSMENT:    1.   Intrauterine pregnancy at 36-6/7 weeks, status post .  2.   Suspected chronic abruption.  3.   Advanced maternal age.  4.   In vitro fertilization  pregnancy.  5.   History of placental site nodule resected hysteroscopically 12/20/2023.    6.   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  7.   History of white coat hypertension.  8.   History of asthma.      PLAN:  Routine postpartum care.  Mom and infant presently doing well.  Placenta to Pathology for evaluation.    Dictated By Liliam [place] M.D.  d: 08/22/2024 16:50:55  t: 08/22/2024 17:37:05  [facility] [id number]  [facility]/

## 2024-08-23 NOTE — PROGRESS NOTES
Labor Analgesia Follow Up Note    Patient underwent epidural anesthesia for labor analgesia,    Placenta Date/Time: 8/22/2024  4:26 PM     Delivery Date/Time:: 8/22/2024   4:21 PM     /70 (BP Location: Right arm)   Pulse (!) 47   Temp 97.9 °F (36.6 °C) (Oral)   Resp 16   Wt 90.7 kg (200 lb)   SpO2 100%   Breastfeeding Yes   BMI 31.32 kg/m²     Assessment:  Patient seen and no apparent anesthesia related complications.    Thank you for asking us to participate in the care of your patient.

## 2024-08-23 NOTE — DISCHARGE SUMMARY
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 The patient progressed well through her labor. On 24 at 1621, pt underwent uneventful spontaneous vaginal delivery with viable female infant. Apgars 8/9, weight 6lb 8.4 oz. Please see delivery note for details.    Overall, pt did well in post-partum period. On postpartum day 1, patient was doing well. Pain was well-controlled. Lochia decreasing. She was tolerating a general diet. Denied nausea or vomiting. Patient denied any dysuria and she was ambulating well. Vital signs were stable. Physical exam was within normal limits.  Hemoglobin was stable. The patient continued to meet postpartum expectations. Pt was discharged home on post-partum day 1.    Recent Labs:  Recent Labs   Lab 24  0823 24  0815   RBC 3.91 3.54*   HGB 12.5 11.2*   HCT 36.0 32.6*   MCV 92.1 92.1   MCH 32.0 31.6   MCHC 34.7 34.4   RDW 12.5 12.1   NEPRELIM 6.26 6.01   WBC 10.8 10.8   .0 183.0           Discharge condition:  D/C'ed home in stable  condition    Discharge diet:  general    Discharge medications:       Medication List        CONTINUE taking these medications      ALBUTEROL SULFATE     cholecalciferol 125 MCG (5000 UT) Caps  Commonly known as: Vitamin D3     prenatal vitamin with DHA 27-0.8-228 MG Caps     XYZAL OR            STOP taking these medications      SYMBICORT IN                Follow up: Follow-up in the office in 6 weeks.     Activity Restrictions:  Nothing per vagina for 6 weeks (no sex, tampons or douching)    Pt understood all instructions and was given copy on discharge home.     Mary Hsu MD

## 2024-08-27 ENCOUNTER — TELEPHONE (OUTPATIENT)
Dept: OBGYN UNIT | Facility: HOSPITAL | Age: 39
End: 2024-08-27

## 2024-08-31 NOTE — L&D DELIVERY NOTE
Kaylee Castro [[id number]]      Labor Events     labor?: Yes   steroids?: Full Course  Antibiotics received during labor?: No  Rupture date/time: 2024 1125     Rupture type: AROM  Fluid color: Clear  Labor type: Induced Onset of Labor  Induction: Oxytocin  Induction comment: Possible placenta abruption        Labor Length    1st stage: 2h 07m  2nd stage: 0h 14m  3rd stage: 0h 04m       Labor Event Times    Labor onset date/time: 2024 1400  Dilation complete date/time: 2024 1607  Start pushing date/time: 2024 1616       [place] Presentation    Presentation: Vertex  Position: Right Occiput Anterior       Operative Delivery    Operative Vaginal Delivery: N/A                Shoulder Dystocia    Shoulder Dystocia: N/A       Anesthesia    Method: Epidural              [place] Delivery      Head delivery date/time: 2024 16:21:18   Delivery date/time:  24 16:21:31   Delivery type: Normal spontaneous vaginal delivery    Details:     Delivery location: delivery room  Delivery Room Temperature: 72       Delivery Providers    Delivering Clinician: Liliam Chandler MD   Delivery personnel:  Provider Role   Flor Pickering, RN Baby Nurse   Francisco Lewis, RN Delivery Nurse   Joyce Edward, RN Delivery Nurse             Cord    Vessels: 3 Vessels  Complications: None  # of loops: 0  Timed cord clamping: Yes  Time in sec: 30  Cord blood disposition: not collected  Gases sent?: No       Resuscitation    Method: None       [place] Measurements      Weight: 2960 g 6 lb 8.4 oz Length: 44.5 cm     Head circum.: 32 cm Chest circum.: 31 cm      Abdominal circum.: 29 cm           Placenta    Date/time: 2024 1626  Removal: Manual Removal  Appearance: Intact  Disposition: Pathology       Apgars    Living status: Living   Apgar Scoring Key:    0 1 2    Skin color Blue or pale Acrocyanotic Completely pink    Heart rate Absent <100 bpm >100 bpm    Reflex irritability No  response Grimace Cry or active withdrawal    Muscle tone Limp Some flexion Active motion    Respiratory effort Absent Weak cry; hypoventilation Good, crying              1 Minute:  5 Minute:  10 Minute:  15 Minute:  20 Minute:      Skin color: 0  1       Heart rate: 2  2       Reflex irritablity: 2  2       Muscle tone: 2  2       Respiratory effort: 2  2       Total: 8  9          Apgars assigned by: JUWAN WEISS  [place] disposition: with mother       Skin to Skin    Skin to skin initiated date/time: 2024 162  Skin to skin with: Mother       Vaginal Count    Initial count RN: Joyce Edward RN  Initial count Tech: Francheska Land    Initial counts 11       Final counts 11       Final count RN: Joyce Edward RN  Final count MD: Liliam Chandler MD       Lacerations    Episiotomy: None  Perineal lacerations: None      Vaginal laceration?: No      Cervical laceration?: No    Clitoral laceration?: No    Quantitative blood loss (mL): 228

## 2024-08-31 NOTE — L&D DELIVERY NOTE
Kaylee Castro [[id number]]      Labor Events     labor?: Yes   steroids?: Full Course  Antibiotics received during labor?: No  Rupture date/time: 2024 1125     Rupture type: AROM  Fluid color: Clear  Labor type: Induced Onset of Labor  Induction: Oxytocin  Induction comment: Possible placenta abruption        Labor Length    1st stage: 2h 07m  2nd stage: 0h 14m  3rd stage: 0h 04m       Labor Event Times    Labor onset date/time: 2024 1400  Dilation complete date/time: 2024 1607  Start pushing date/time: 2024 1616       [place] Presentation    Presentation: Vertex  Position: Right Occiput Anterior       Operative Delivery    Operative Vaginal Delivery: N/A                Shoulder Dystocia    Shoulder Dystocia: N/A       Anesthesia    Method: Epidural              [place] Delivery      Head delivery date/time: 2024 16:21:18   Delivery date/time:  24 16:21:31   Delivery type: Normal spontaneous vaginal delivery    Details:     Delivery location: delivery room  Delivery Room Temperature: 72       Delivery Providers    Delivering Clinician: Liliam Chandler MD   Delivery personnel:  Provider Role   Flor Pickering, RN Baby Nurse   Francisco Lewis, RN Delivery Nurse   Joyce Edward, RN Delivery Nurse             Cord    Vessels: 3 Vessels  Complications: None  # of loops: 0  Timed cord clamping: Yes  Time in sec: 30  Cord blood disposition: not collected  Gases sent?: No       Resuscitation    Method: None       [place] Measurements      Weight: 2960 g 6 lb 8.4 oz Length: 44.5 cm     Head circum.: 32 cm Chest circum.: 31 cm      Abdominal circum.: 29 cm           Placenta    Date/time: 2024 1626  Removal: Manual Removal  Appearance: Intact  Disposition: Pathology       Apgars    Living status: Living   Apgar Scoring Key:    0 1 2    Skin color Blue or pale Acrocyanotic Completely pink    Heart rate Absent <100 bpm >100 bpm    Reflex irritability No  response Grimace Cry or active withdrawal    Muscle tone Limp Some flexion Active motion    Respiratory effort Absent Weak cry; hypoventilation Good, crying              1 Minute:  5 Minute:  10 Minute:  15 Minute:  20 Minute:      Skin color: 0  1       Heart rate: 2  2       Reflex irritablity: 2  2       Muscle tone: 2  2       Respiratory effort: 2  2       Total: 8  9          Apgars assigned by: JUWAN WEISS  [place] disposition: with mother       Skin to Skin    Skin to skin initiated date/time: 2024 162  Skin to skin with: Mother       Vaginal Count    Initial count RN: Joyce Edward RN  Initial count Tech: Francheska Land    Initial counts 11       Final counts 11       Final count RN: Joyce Edward RN  Final count MD: Liliam Chandler MD       Lacerations    Episiotomy: None  Perineal lacerations: None      Vaginal laceration?: No      Cervical laceration?: No    Clitoral laceration?: No    Quantitative blood loss (mL): 228

## 2024-09-25 ENCOUNTER — TELEPHONE (OUTPATIENT)
Age: 39
End: 2024-09-25

## 2024-11-13 ENCOUNTER — V-VISIT (OUTPATIENT)
Dept: URGENT CARE | Age: 39
End: 2024-11-13

## 2024-11-13 VITALS
RESPIRATION RATE: 16 BRPM | SYSTOLIC BLOOD PRESSURE: 118 MMHG | TEMPERATURE: 98.1 F | WEIGHT: 175 LBS | OXYGEN SATURATION: 99 % | HEIGHT: 67 IN | DIASTOLIC BLOOD PRESSURE: 75 MMHG | BODY MASS INDEX: 27.47 KG/M2 | HEART RATE: 72 BPM

## 2024-11-13 DIAGNOSIS — J01.10 ACUTE NON-RECURRENT FRONTAL SINUSITIS: Primary | ICD-10-CM

## 2024-11-13 PROCEDURE — 99213 OFFICE O/P EST LOW 20 MIN: CPT | Performed by: NURSE PRACTITIONER

## 2024-11-13 RX ORDER — ALBUTEROL SULFATE 90 UG/1
INHALANT RESPIRATORY (INHALATION)
COMMUNITY
Start: 2024-10-04

## 2024-11-13 RX ORDER — FLUTICASONE FUROATE 200 UG/1
POWDER RESPIRATORY (INHALATION)
COMMUNITY
Start: 2024-11-04

## 2024-11-13 ASSESSMENT — ENCOUNTER SYMPTOMS
SORE THROAT: 0
CHILLS: 1
SINUS PRESSURE: 1
HEADACHES: 1
COUGH: 1
FEVER: 0
SINUS PAIN: 1

## 2025-01-11 ENCOUNTER — APPOINTMENT (OUTPATIENT)
Dept: URGENT CARE | Age: 40
End: 2025-01-11

## (undated) DEVICE — LIGASURE IMPACT OPEN DEVICE

## (undated) DEVICE — REM POLYHESIVE ADULT PATIENT RETURN ELECTRODE: Brand: VALLEYLAB

## (undated) DEVICE — SUTURE SILK 3-0 SH

## (undated) DEVICE — SUTURE SILK 2-0

## (undated) DEVICE — TOWEL: OR BLU 80/CS: Brand: MEDICAL ACTION INDUSTRIES

## (undated) DEVICE — POST OP TSCOPE PREMIER

## (undated) DEVICE — SUTURE VICRYL 3-0 SH

## (undated) DEVICE — VIOLET BRAIDED (POLYGLACTIN 910), SYNTHETIC ABSORBABLE SUTURE: Brand: COATED VICRYL

## (undated) DEVICE — PROXIMATE SKIN STAPLERS (35 WIDE) CONTAINS 35 STAINLESS STEEL STAPLES (FIXED HEAD): Brand: PROXIMATE

## (undated) DEVICE — UNDYED BRAIDED (POLYGLACTIN 910), SYNTHETIC ABSORBABLE SUTURE: Brand: COATED VICRYL

## (undated) DEVICE — 3M™ STERI-STRIP™ REINFORCED ADHESIVE SKIN CLOSURES, R1547, 1/2 IN X 4 IN (12 MM X 100 MM), 6 STRIPS/ENVELOPE: Brand: 3M™ STERI-STRIP™

## (undated) DEVICE — SUTURE PDS LOOPED 60CM

## (undated) DEVICE — GLOVE ORTHO ALOETOUCH SZ 8-1/2

## (undated) DEVICE — GOWN,SIRUS,FABRIC-REINFORCED,X-LARGE: Brand: MEDLINE

## (undated) DEVICE — LAPAROTOMY SPONGE - RF AND X-RAY DETECTABLE PRE-WASHED: Brand: SITUATE

## (undated) DEVICE — CHLORAPREP 26ML APPLICATOR

## (undated) DEVICE — KENDALL SCD EXPRESS SLEEVES, KNEE LENGTH, MEDIUM: Brand: KENDALL SCD

## (undated) DEVICE — LAPAROTOMY CDS: Brand: MEDLINE INDUSTRIES, INC.

## (undated) DEVICE — SUTURE SILK 0

## (undated) DEVICE — POOLE SUCTION HANDLE: Brand: CARDINAL HEALTH

## (undated) DEVICE — GUID PIN ORTH 3MM KNEE FEM TIB

## (undated) DEVICE — SOL  .9 1000ML BTL

## (undated) NOTE — LETTER
2024      Mary Hsu MD  [address]  [address]  [facility] [address]  Via Fax: [phone]  Patient: Libertad Castro  : 1985    Dear Colleague:  Thank you for referring your patient to me for a Maternal Fetal Medicine evaluation. Please see my attached note for my findings and recommendations.      Should you have any questions or concerns, please do not hesitate to contact me at the number listed below.    Best Regards,      Jocelin Aguirre MD  [facility]   [address]  [facility] [id number] [phone]    cc: No Recipients      [facility] Department of Maternal Fetal Medicine  Patient Name: Libertad Castro  Patient : 1985  Physician: Jocelin Aguirre MD    Pt here for Level II Ultrasound  +fm noted per patient  Pt denies complaints.     Outpatient Maternal-Fetal Medicine Consultation    Dear Dr. Hsu,    Thank you for requesting ultrasound evaluation and maternal fetal medicine consultation on your patient Libertad Castro.  As you are aware she is a 38 year old female with a Gurrola pregnancy at 20w0d.  A maternal-fetal medicine consultation was requested secondary to advanced maternal age and IVF pregnancy.  Her prenatal records and labs were reviewed.    This is a frozen embryo transfer and the eggs were retrieved when she was 32 years old.  There was no preimplantation genetic screening.  She declined cell free DNA screening.  Genetic testing also declined.    HISTORY  OB History    Para Term  AB Living   5 2 2 0 2 2   SAB IAB Ectopic Multiple Live Births   2 0 0 0 2     # 1 - Date: 2018, Sex: None, Weight: None, GA: None, Type: Spontaneous , Apgar1: None, Apgar5: None, Living: None, Birth Comments: None    # 2 - Date: 07/15/19, Sex: Male, Weight: 7 lb 1.6 oz (3.22 kg), GA: 40w2d, Type: Normal spontaneous vaginal delivery, Apgar1: 9, Apgar5: 9, Living: Living, Birth Comments: None    # 3 - Date:  19, Sex: Female, Weight: 7 lb 10.4 oz (3.47 kg), GA: 39w5d, Type: Normal spontaneous vaginal delivery, Apgar1: 9, Apgar5: 9, Living: Living, Birth Comments: None    # 4 - Date: 21, Sex: None, Weight: None, GA: 6w0d, Type: Spontaneous , Apgar1: None, Apgar5: None, Living: None, Birth Comments: None    # 5 - Date: None, Sex: None, Weight: None, GA: None, Type: None, Apgar1: None, Apgar5: None, Living: None, Birth Comments: None    Past Medical History  The patient  has a past medical history of Abdominal adhesions, Asthma (HCC), Back problem, Malrotation of intestine (HCC), Premature baby (HCC), and Premature birth (HCC).    She has no past medical history of Anesthesia complication or Difficult intubation.    Past Surgical History  The patient  has a past surgical history that includes other; appendectomy; other surgical history (2017); and Breast lumpectomy.    Family History  The patient She indicated that her mother is alive. She indicated that her father is alive.    Medications:   Current Outpatient Medications:     aspirin 81 MG Oral Tab EC, Take 2 tablets (162 mg total) by mouth daily., Disp: , Rfl:     cholecalciferol (VITAMIN D3) 125 MCG (5000 UT) Oral Cap, Take 1 capsule (5,000 Units total) by mouth daily., Disp: , Rfl:     prenatal multivitamin plus DHA 27-0.8-228 MG Oral Cap, Take 1 capsule by mouth daily., Disp: , Rfl:     Levocetirizine Dihydrochloride (XYZAL OR), Take 1 tablet by mouth daily.  , Disp: , Rfl:     ALBUTEROL SULFATE, PRN, Disp: , Rfl:     ibuprofen 600 MG Oral Tab, Take 1 tablet (600 mg total) by mouth every 6 (six) hours as needed for Fever., Disp: 20 tablet, Rfl: 0    SYMBICORT IN, 2 puffs once daily (Patient not taking: Reported on 2024), Disp: , Rfl:   Allergies:   Allergies   Allergen Reactions    Bananas     Melons     Seasonal        PHYSICAL EXAMINATION:  /73 (BP Location: Right arm, Patient Position: Sitting, Cuff Size: adult)   Pulse 66   Ht  5' 7\" (1.702 m)   Wt 183 lb (83 kg)   BMI 28.66 kg/m²   General: alert and oriented,no acute distress  Abdomen: gravid, soft, non-tender  Extremities: non-tender, no edema      OBSTETRIC ULTRASOUND  The patient had a level 2 ultrasound today which I interpreted the results and reviewed them with the patient.    Ultrasound Findings:  Single IUP in cephalic presentation.    Placenta is anterior.   A 3 vessel cord is noted.  Cardiac activity is present at 146 bpm   g ( 0 lb 12 oz);   MVP is 5.7 cm .     The fetal measurements are consistent with the established EDC. No ultrasound evidence of structural abnormalities are seen today. The nasal bone is present. No ultrasound evidence of markers for aneuploidy are seen. She understands that ultrasound exam cannot exclude genetic abnormalities and that genetic testing is recommended. The limitations of ultrasound were discussed.     Uterus and adnexa appeared normal  today on US    See imaging tab for the complete US report.    DISCUSSION  During her visit we discussed and reviewed the following issues:  Assisted Reproductive technology -   Conception by IVF is associated with an increased incidence of several obstetrical and  complications. Most of these are related to the high incidence of multiple gestations. The precise reasons for this increase in adverse outcomes are not clear.    ART is associated with an up to two-fold increased risk of  birth and low birth weight in gurrola pregnancies. ART does not appear to be an independent risk factor for adverse neurodevelopment outcome. ART appears to be at increased risk of delivering offspring with congenital malformations compared with fertile women who conceive naturally. Heart defects have been reported as high at 6 % so fetal echocardiography is recommended in all IVF patients. Stillbirth and  mortality rates appear to be increased as much as four-fold.    Gurrola ART pregnancies,  the relative risk of common pregnancy complications such as fetal growth restriction, preeclampsia, prematurity and  mortality are increased.    ADVANCED MATERNAL AGE    Background  I reviewed with the patient that pregnancies in women of advanced maternal age (35 or older at delivery) are associated with elevated risks. Specifically, there is a higher rate of:  Fetal malformations  Preeclampsia  Gestational diabetes  Intrauterine fetal death    As a result, enhanced pregnancy surveillance is advised for these patients including a comprehensive ultrasound to assess for fetal malformations (at 20 weeks) and a third trimester ultrasound assessment for fetal growth (at 32 weeks). In addition, weekly NST's (initiating at 36 weeks gestation for women 35-39 years and at 32 weeks gestation for women 40 years and older) are also advised. Routine obstetric care is more than adequate to assess for gestational diabetes and preeclampsia; hence, no further significant alterations in obstetric care are advised.    Medical Complications    Women 35 years of age or older can expect to experience two to three fold higher rates of hospitalization,  delivery, and pregnancy-related complications when compared to their younger counterparts.  The two most common medical problems complicating these  pregnanccies are hypertension and diabetes.   The incidence of preeclampsia in the general obstetric population is 3 to 4 percent; this increases to 5 to 10 percent in women over age 40 and is as high as 35 percent in women over age 50.   The incidence of gestational diabetes in the general obstetric population is 3 percent, rising to 7 to 12 percent in women over age 40 and 20 percent in women over age 50.  Women 35 years of age or older are more likely to be delivered by . The  delivery rate in the general obstetric population of the [place] is almost 30 percent, compared to almost 50 percent in women  over age 40 to 45 and almost 80 percent in women age 50 to 63.          Fetal Death        A decision analysis tool using data from the [place]  Database predicted a strategy of weekly antepartum testing and labor induction would lower the risk of unexplained fetal death in women 35 years of age or older. In this model, weekly testing starting at 36 weeks of gestation would drop the risk of fetal death from 5.2 to 1.3 per 1000 pregnancies. While a policy of antepartum testing in older women does increase the chance that a women will be induced (71 inductions per fetal death averted) and thereby increases her risk of having a  delivery, only 14 additional cesareans would need to be performed to avert one unexplained fetal death.  Hence, weekly NST's are advised for women of advanced maternal age; testing should be initiated at 36 weeks for women 35-39 years and at 32 weeks for women 40 years and older.    Fetal Malformations    Cardiac malformations, clubfoot, and diaphragmatic hernia appear to occur with increased frequency in offspring of older women. These abnormalities are structural and unrelated to aneuploidy, thus they would not be detected by karyotype analysis.  For these reasons a complete, detailed ultrasound (level II) is advised even if the fetus has a normal karyotype.      Fetal Aneuploidy  We also discussed the increased risk of chromosomal abnormalities associated with advanced maternal age. I reviewed that an ultrasound examination cannot reliably exclude potential genetic abnormalities. Given that the patient will be 38 years old at the time of delivery I reviewed that her risk (at amniocentesis) of having a fetus with any chromosome abnormality is 1:40 and with trisomy 21 is 1: 70.    Invasive Testing  I offered invasive genetic testing (amniocentesis, chorionic villus sampling) after reviewing the diagnostic accuracy of these tests as well as the procedure associated  loss rate (1:500 for genetic amniocentesis).    She ultimately does not desire invasive genetic testing.     Non-invasive Pregnancy Testing (NIPT) -   I reviewed current non-invasive screening options. Currently non-invasive pregnancy testing (NIPT) offers the highest detection rate (with the lowest false positive rate) for the detection of fetal aneuploidy amongst high-risk patients. The limitations of detailed mid-trimester sonography was reviewed with the patient. First trimester screening and second trimester multiple-marker serum serum screening as alternative aneuploidy screening options were also reviewed. However, both of these tests are associated with lower detection and higher false positive rates.    She declined aneuploidy screening and aneuploidy testing.  She would not act on the results.  She also understands that the genetic risk is based on her age and I agreed to of her which was 32 years of age.    We discussed the recommended plan of care based on her  risk factors.  Libertad had her questions answered to her satisfaction.      IMPRESSION:  IUP at 20w0d  Normal level II ultrasound  IVF pregnancy  Advanced maternal age, low risk cell free DNA screen    RECOMMENDATIONS:  Continue care with Dr. Hsu  Fetal echocardiogram at 22-24 weeks  Follow-up growth & BPP ultrasound at 32 weeks.  Weekly NST's at 32 weeks.  Twice weekly testing at 38 weeks - weekly NST and weekly BPP.  Delivery at 39-40 weeks.      Total time spent 40 minutes this calendar day which includes preparing to see the patient including chart review, obtaining and/or reviewing additional medical history, performing a physical exam and evaluation, documenting clinical information in the electronic medical record, independently interpreting results, counseling the patient, communicating results to the patient/family/caregiver and coordinating care.     Case discussed with patient who demonstrated understanding and agreement with  plan.     Thank you for allowing me to participate in the care of this patient.  Please feel free to contact me with any questions.    Jocelin Aguirre MD  Maternal-Fetal Medicine       Note to patient and family:  The 21st Century Cures Act makes medical notes available to patients in the interest of transparency.  However, please be advised that this is a medical document.  It is intended as a peer to peer communication.  It is written in medical language and may contain abbreviations or verbiage that are technical and unfamiliar.  It may appear blunt or direct.  Medical documents are intended to carry relevant information, facts as evident, and the clinical opinion of the practitioner.

## (undated) NOTE — LETTER
Dear New MomKavon, we missed you! The nurses of [facility] have tried to reach you by phone to ask if you have any questions regarding your health or the health and care of your new little one.    We hope you are doing well. If, for any reason, you have questions or concerns about your health or your baby’s health, please contact your provider or your pediatrician or family medicine physician regarding your baby.     At [facility], we feel that postpartum support is very important for new families. Please see the enclosed new parent support flyer that lists support programs and resources with both in-person and online options.     Additionally, our Breastfeeding Centers at [facility] and [facility] in [place], offer outpatient visits with our International Board-Certified Lactation   Consultants (IBCLCs) for any breastfeeding concerns or questions you may have.    For issues related to stress, anxiety or depression, we have a Nurturing Mom support group that meets both in-person or online.  There’s also a 24-hour Mom’s Line where you can request a phone call from a clinical therapist for assistance for postpartum depression.    We encourage you to take advantage of these programs and resources as you recover from childbirth and learn to care for your new infant.    Best wishes,    [facility]            [id number]

## (undated) NOTE — LETTER
[facility]  Jaden Burnett 59 [address] [facility], [address] [place] [address]    Consent for Operation    Date: __________________    Time: _______________    1.  I authorize the performance upon [facility] the following operation:    Procedure(s):  EXPLO procedure has been videotaped, the surgeon will obtain the original videotape. The hospital will not be responsible for storage or maintenance of this tape.     6. For the purpose of advancing medical education, I consent to the admittance of observers to t STATEMENTS REQUIRING INSERTION OR COMPLETION WERE FILLED IN.     Signature of Patient:   ___________________________    When the patient is a minor or mentally incompetent to give consent:  Signature of person authorized to consent for patient: ____________ supplements, and pills I can buy without a prescription (including street drugs/illegal medications). Failure to inform my anesthesiologist about these medicines may increase my risk of anesthetic complications.   · If I am allergic to anything or have had Anesthesiologist Signature     Date   Time  I have discussed the procedure and information above with the patient (or patient’s representative) and answered their questions. The patient or their representative has agreed to have anesthesia services.     ___

## (undated) NOTE — LETTER
Dear new mom:    We've missed you! The nurses of [facility] have tried to reach you by phone to ask if you had any questions regarding your health or the care of your new little one.     Please feel free to call your doctor with an

## (undated) NOTE — IP AVS SNAPSHOT
[facility] [place] [address] [place], [address] ~ [phone]                Discharge Summary   5/24/2017    [address] [facility]           Admission Information        Provider Department    5/24/2017 Zuri Palacios MD  Please  your prescriptions at the location directed by your doctor or nurse     Bring a paper prescription for each of these medications    - ibuprofen 600 MG Tabs              Patient Instructions       No lifting over 10 pounds for 4 weeks.     No 7.7 (05/24/17)  1.1 (05/24/17)  0.2 -- (05/24/17)  2.91 (05/24/17)  1.94 (05/24/17)  0.41 (05/24/17)  0.06 (05/24/17)  1.17      Metabolic Lab Results  (Last result in the past 90 days)    HgbA1C Glucose BUN Creatinine Calcium Alkaline Phosph AST    -- (05 your Zip Code and Date of Birth to complete the sign-up process. If you do not sign up before the expiration date, you must request a new code.     Your unique [employer] Access Code: [email or web address]  Expires: 7/23/2017  4:14 AM    If you have questions, you can c All Other Medications     VIK 3-0.02 MG OR TABS

## (undated) NOTE — LETTER
May 28, 2024      Mary Hsu MD  [address]  [address]  [facility] [address]  Via Fax: [phone]  Patient: Libertad Castro  : 1985    Dear Colleague:  Thank you for referring your patient to me for a Maternal Fetal Medicine evaluation. Please see my attached note for my findings and recommendations.      Should you have any questions or concerns, please do not hesitate to contact me at the number listed below.    Best Regards,      Terell Howell MD  [facility]   [address]  [facility] [id number] [phone]    cc: No Recipients      [facility] Department of Maternal Fetal Medicine  Patient Name: Libertad Castro  Patient : 1985  Physician: Terell Howell MD    Outpatient Maternal-Fetal Medicine Follow-Up    Dear Dr. Hsu    Thank you for requesting ultrasound evaluation and maternal fetal medicine consultation on your patient Libertad Castro.  As you are aware she is a 38 year old female  with a gay pregnancy and an Estimated Date of Delivery: 24.  She returned to maternal-fetal medicine today for a follow-up visit.  Her history was reviewed from her prior visit and there were no interval changes.    Antepartum Risk Factors  IVF pregnancy  Advanced maternal age, low risk cell free DNA screen    PHYSICAL EXAMINATION:  /64 (BP Location: Right arm, Patient Position: Sitting, Cuff Size: adult)   Pulse 57   Ht 5' 7\" (1.702 m)   Wt 191 lb (86.6 kg)   BMI 29.91 kg/m²   General: alert and oriented, no acute distress  Abdomen: gravid, soft, non-tender  Extremities: non-tender, no edema    OBSTETRIC ULTRASOUND    FETAL ECHOCARDIOGRAM:    Single IUP in cephalic presentation.    Placenta is anterior.   A 3 vessel cord is noted.  Cardiac activity is present at 136 bpm  MVP is 6.7 cm .      A transabdominal 2D Doppler, fetal echocardiogram is performed. There is a four-chamber heart of  appropriate cardiac dimensions. 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 Intracardiac connection appear to be normal.  The  atrioventricular valves appear normal. There is no evidence of pericardial or pleural effusion. The A-V conduction is one to one and the heart rate is appropriate for gestational age. No evidence of fetal arrhythmias is seen during today's study. There is a right to left shunting across the patent segundo ovale. There is a normal appearance of the aorta and branching pattern of the head vessels.    Normal fetal Doppler interrogations    There appears to be a structurally  normal fetal heart and rhythm. The patient was made aware of the limitations of the fetal heart study: malformations such as but not limiting to minor valve , VSD, anomalous pulmonary venus return, or coarctation of the aorta maybe missed. I discussed the results with the patient.          See PACS/Imaging Tab For Complete Ultrasound Report  I interpreted the results and reviewed them with the patient.    DISCUSSION  During her visit we discussed and reviewed the following issues:  ADVANCED MATERNAL AGE  See prior [facility] notes for a detailed review.  She did not desire invasive genetic testing.   She has already obtained a low-risk NPIT result and was appropriately reassured.     IVF GESTATION  See prior MFM notes for a detailed review.    IMPRESSION:  IUP at 24w4d  IVF pregnancy  Advanced maternal age, low risk cell free DNA screen     RECOMMENDATIONS:  Continue care with Dr. Hsu  Follow-up growth & BPP ultrasound at 32 weeks.  Weekly NST's at 32 weeks.  Twice weekly testing at 38 weeks - weekly NST and weekly BPP.  Delivery at 39-40 weeks.    Thank you for allowing me to participate in the care of your patient.  Please do not hesitate to contact me if additional questions or concerns arise.      Terell Howell M.D.    30 minutes spent in review of records, patient consultation, documentation and coordination of care.  The  relevant clinical matter(s) are summarized above.     Note to patient and family  The 21st Century Cures Act makes medical notes available to patients in the interest of transparency.  However, please be advised that this is a medical document.  It is intended as [id number]-bkja communication.  It is written and medical language may contain abbreviations or verbiage that are technical and unfamiliar.  It may appear blunt or direct.  Medical documents are intended to carry relevant information, facts as evident, and the clinical opinion of the practitioner.      Pt here for fetal echo  + FM  No complaints

## (undated) NOTE — LETTER
Dear new mom:    We've missed you! The nurses of [facility] have tried to reach you by phone to ask if you had any questions regarding your health or the care of your new little one.     Please feel free to call your doctor with an